# Patient Record
Sex: MALE | Race: WHITE | NOT HISPANIC OR LATINO | Employment: FULL TIME | URBAN - METROPOLITAN AREA
[De-identification: names, ages, dates, MRNs, and addresses within clinical notes are randomized per-mention and may not be internally consistent; named-entity substitution may affect disease eponyms.]

---

## 2024-09-05 ENCOUNTER — APPOINTMENT (OUTPATIENT)
Dept: RADIOLOGY | Facility: HOSPITAL | Age: 34
End: 2024-09-05
Payer: MEDICAID

## 2024-09-05 ENCOUNTER — HOSPITAL ENCOUNTER (EMERGENCY)
Facility: HOSPITAL | Age: 34
Discharge: HOME | End: 2024-09-05
Attending: STUDENT IN AN ORGANIZED HEALTH CARE EDUCATION/TRAINING PROGRAM
Payer: MEDICAID

## 2024-09-05 VITALS
DIASTOLIC BLOOD PRESSURE: 97 MMHG | HEART RATE: 96 BPM | OXYGEN SATURATION: 99 % | HEIGHT: 69 IN | BODY MASS INDEX: 23.7 KG/M2 | RESPIRATION RATE: 20 BRPM | WEIGHT: 160 LBS | TEMPERATURE: 98.1 F | SYSTOLIC BLOOD PRESSURE: 151 MMHG

## 2024-09-05 DIAGNOSIS — S52.531A COLLES' FRACTURE OF RIGHT RADIUS, INIT FOR CLOS FX: Primary | ICD-10-CM

## 2024-09-05 DIAGNOSIS — S52.614A CLOSED NONDISPLACED FRACTURE OF STYLOID PROCESS OF RIGHT ULNA, INITIAL ENCOUNTER: ICD-10-CM

## 2024-09-05 DIAGNOSIS — E87.6 HYPOKALEMIA: ICD-10-CM
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
## 2024-09-05 LAB
ABO GROUP (TYPE) IN BLOOD: NORMAL
ALBUMIN SERPL BCP-MCNC: 5.2 G/DL (ref 3.4–5)
ALP SERPL-CCNC: 62 U/L (ref 33–120)
ALT SERPL W P-5'-P-CCNC: 24 U/L (ref 10–52)
ANION GAP SERPL CALC-SCNC: 16 MMOL/L (ref 10–20)
ANTIBODY SCREEN: NORMAL
APTT PPP: 28 SECONDS (ref 27–38)
AST SERPL W P-5'-P-CCNC: 22 U/L (ref 9–39)
BASOPHILS # BLD AUTO: 0.05 X10*3/UL (ref 0–0.1)
BASOPHILS NFR BLD AUTO: 0.5 %
BILIRUB SERPL-MCNC: 0.4 MG/DL (ref 0–1.2)
BUN SERPL-MCNC: 17 MG/DL (ref 6–23)
CALCIUM SERPL-MCNC: 9.6 MG/DL (ref 8.6–10.3)
CHLORIDE SERPL-SCNC: 105 MMOL/L (ref 98–107)
CO2 SERPL-SCNC: 25 MMOL/L (ref 21–32)
CREAT SERPL-MCNC: 1.04 MG/DL (ref 0.5–1.3)
EGFRCR SERPLBLD CKD-EPI 2021: >90 ML/MIN/1.73M*2
EOSINOPHIL # BLD AUTO: 0.03 X10*3/UL (ref 0–0.7)
EOSINOPHIL NFR BLD AUTO: 0.3 %
ERYTHROCYTE [DISTWIDTH] IN BLOOD BY AUTOMATED COUNT: 12.8 % (ref 11.5–14.5)
GLUCOSE SERPL-MCNC: 112 MG/DL (ref 74–99)
HCT VFR BLD AUTO: 41.5 % (ref 41–52)
HGB BLD-MCNC: 14.5 G/DL (ref 13.5–17.5)
IMM GRANULOCYTES # BLD AUTO: 0.08 X10*3/UL (ref 0–0.7)
IMM GRANULOCYTES NFR BLD AUTO: 0.9 % (ref 0–0.9)
INR PPP: 1 (ref 0.9–1.1)
LYMPHOCYTES # BLD AUTO: 2.53 X10*3/UL (ref 1.2–4.8)
LYMPHOCYTES NFR BLD AUTO: 26.9 %
MAGNESIUM SERPL-MCNC: 1.73 MG/DL (ref 1.6–2.4)
MCH RBC QN AUTO: 32.1 PG (ref 26–34)
MCHC RBC AUTO-ENTMCNC: 34.9 G/DL (ref 32–36)
MCV RBC AUTO: 92 FL (ref 80–100)
MONOCYTES # BLD AUTO: 0.54 X10*3/UL (ref 0.1–1)
MONOCYTES NFR BLD AUTO: 5.8 %
NEUTROPHILS # BLD AUTO: 6.16 X10*3/UL (ref 1.2–7.7)
NEUTROPHILS NFR BLD AUTO: 65.6 %
NRBC BLD-RTO: 0 /100 WBCS (ref 0–0)
PLATELET # BLD AUTO: 392 X10*3/UL (ref 150–450)
POTASSIUM SERPL-SCNC: 3.1 MMOL/L (ref 3.5–5.3)
PROT SERPL-MCNC: 7.5 G/DL (ref 6.4–8.2)
PROTHROMBIN TIME: 11.6 SECONDS (ref 9.8–12.8)
RBC # BLD AUTO: 4.52 X10*6/UL (ref 4.5–5.9)
RH FACTOR (ANTIGEN D): NORMAL
SODIUM SERPL-SCNC: 143 MMOL/L (ref 136–145)
WBC # BLD AUTO: 9.4 X10*3/UL (ref 4.4–11.3)

## 2024-09-05 PROCEDURE — 73060 X-RAY EXAM OF HUMERUS: CPT | Mod: RT

## 2024-09-05 PROCEDURE — 2500000002 HC RX 250 W HCPCS SELF ADMINISTERED DRUGS (ALT 637 FOR MEDICARE OP, ALT 636 FOR OP/ED)

## 2024-09-05 PROCEDURE — 73090 X-RAY EXAM OF FOREARM: CPT | Mod: RIGHT SIDE | Performed by: RADIOLOGY

## 2024-09-05 PROCEDURE — 36415 COLL VENOUS BLD VENIPUNCTURE: CPT

## 2024-09-05 PROCEDURE — 85610 PROTHROMBIN TIME: CPT

## 2024-09-05 PROCEDURE — 80053 COMPREHEN METABOLIC PANEL: CPT

## 2024-09-05 PROCEDURE — 2500000001 HC RX 250 WO HCPCS SELF ADMINISTERED DRUGS (ALT 637 FOR MEDICARE OP)

## 2024-09-05 PROCEDURE — 96375 TX/PRO/DX INJ NEW DRUG ADDON: CPT | Performed by: EMERGENCY MEDICINE

## 2024-09-05 PROCEDURE — 29125 APPL SHORT ARM SPLINT STATIC: CPT

## 2024-09-05 PROCEDURE — 2500000005 HC RX 250 GENERAL PHARMACY W/O HCPCS

## 2024-09-05 PROCEDURE — 73080 X-RAY EXAM OF ELBOW: CPT | Mod: RT

## 2024-09-05 PROCEDURE — 73090 X-RAY EXAM OF FOREARM: CPT | Mod: RT

## 2024-09-05 PROCEDURE — 96376 TX/PRO/DX INJ SAME DRUG ADON: CPT | Performed by: EMERGENCY MEDICINE

## 2024-09-05 PROCEDURE — 2500000004 HC RX 250 GENERAL PHARMACY W/ HCPCS (ALT 636 FOR OP/ED)

## 2024-09-05 PROCEDURE — 85025 COMPLETE CBC W/AUTO DIFF WBC: CPT

## 2024-09-05 PROCEDURE — 25605 CLTX DST RDL FX/EPHYS SEP W/: CPT | Mod: RT | Performed by: STUDENT IN AN ORGANIZED HEALTH CARE EDUCATION/TRAINING PROGRAM

## 2024-09-05 PROCEDURE — 84075 ASSAY ALKALINE PHOSPHATASE: CPT

## 2024-09-05 PROCEDURE — 73110 X-RAY EXAM OF WRIST: CPT | Mod: RIGHT SIDE | Performed by: RADIOLOGY

## 2024-09-05 PROCEDURE — 86901 BLOOD TYPING SEROLOGIC RH(D): CPT

## 2024-09-05 PROCEDURE — 73110 X-RAY EXAM OF WRIST: CPT | Mod: RT

## 2024-09-05 PROCEDURE — 83735 ASSAY OF MAGNESIUM: CPT

## 2024-09-05 PROCEDURE — 99285 EMERGENCY DEPT VISIT HI MDM: CPT | Mod: 25 | Performed by: EMERGENCY MEDICINE

## 2024-09-05 PROCEDURE — 73080 X-RAY EXAM OF ELBOW: CPT | Mod: RIGHT SIDE | Performed by: RADIOLOGY

## 2024-09-05 PROCEDURE — 96374 THER/PROPH/DIAG INJ IV PUSH: CPT | Performed by: EMERGENCY MEDICINE

## 2024-09-05 PROCEDURE — 73060 X-RAY EXAM OF HUMERUS: CPT | Mod: RIGHT SIDE | Performed by: RADIOLOGY

## 2024-09-05 RX ORDER — OXYCODONE HYDROCHLORIDE 5 MG/1
5 TABLET ORAL ONCE
Status: COMPLETED | OUTPATIENT
Start: 2024-09-05 | End: 2024-09-05

## 2024-09-05 RX ORDER — ACETAMINOPHEN 325 MG/1
650 TABLET ORAL EVERY 6 HOURS PRN
Qty: 56 TABLET | Refills: 0 | Status: SHIPPED | OUTPATIENT
Start: 2024-09-05 | End: 2024-09-12

## 2024-09-05 RX ORDER — LIDOCAINE HYDROCHLORIDE AND EPINEPHRINE 10; 10 MG/ML; UG/ML
10 INJECTION, SOLUTION INFILTRATION; PERINEURAL ONCE
Status: COMPLETED | OUTPATIENT
Start: 2024-09-05 | End: 2024-09-05

## 2024-09-05 RX ORDER — IBUPROFEN 600 MG/1
600 TABLET ORAL EVERY 6 HOURS PRN
Qty: 28 TABLET | Refills: 0 | Status: SHIPPED | OUTPATIENT
Start: 2024-09-05 | End: 2024-09-12

## 2024-09-05 RX ORDER — HYDROMORPHONE HYDROCHLORIDE 1 MG/ML
1 INJECTION, SOLUTION INTRAMUSCULAR; INTRAVENOUS; SUBCUTANEOUS ONCE
Status: COMPLETED | OUTPATIENT
Start: 2024-09-05 | End: 2024-09-05

## 2024-09-05 RX ORDER — MIDAZOLAM HYDROCHLORIDE 1 MG/ML
2 INJECTION, SOLUTION INTRAMUSCULAR; INTRAVENOUS ONCE
Status: COMPLETED | OUTPATIENT
Start: 2024-09-05 | End: 2024-09-05

## 2024-09-05 RX ORDER — HYDROCODONE BITARTRATE AND ACETAMINOPHEN 5; 325 MG/1; MG/1
1 TABLET ORAL EVERY 4 HOURS PRN
Qty: 18 TABLET | Refills: 0 | Status: SHIPPED | OUTPATIENT
Start: 2024-09-05 | End: 2024-09-08

## 2024-09-05 RX ORDER — FENTANYL CITRATE 50 UG/ML
50 INJECTION, SOLUTION INTRAMUSCULAR; INTRAVENOUS ONCE
Status: COMPLETED | OUTPATIENT
Start: 2024-09-05 | End: 2024-09-05

## 2024-09-05 RX ORDER — POTASSIUM CHLORIDE 14.9 MG/ML
20 INJECTION INTRAVENOUS
Status: DISCONTINUED | OUTPATIENT
Start: 2024-09-05 | End: 2024-09-05

## 2024-09-05 RX ORDER — POTASSIUM CHLORIDE 20 MEQ/1
40 TABLET, EXTENDED RELEASE ORAL ONCE
Status: COMPLETED | OUTPATIENT
Start: 2024-09-05 | End: 2024-09-05

## 2024-09-05 RX ADMIN — HYDROMORPHONE HYDROCHLORIDE 1 MG: 1 INJECTION INTRAMUSCULAR; INTRAVENOUS; SUBCUTANEOUS at 16:45

## 2024-09-05 RX ADMIN — HYDROMORPHONE HYDROCHLORIDE 1 MG: 1 INJECTION INTRAMUSCULAR; INTRAVENOUS; SUBCUTANEOUS at 15:04

## 2024-09-05 RX ADMIN — LIDOCAINE HYDROCHLORIDE,EPINEPHRINE BITARTRATE 10 ML: 10; .01 INJECTION, SOLUTION INFILTRATION; PERINEURAL at 17:21

## 2024-09-05 RX ADMIN — POTASSIUM CHLORIDE 40 MEQ: 1500 TABLET, EXTENDED RELEASE ORAL at 16:26

## 2024-09-05 RX ADMIN — OXYCODONE HYDROCHLORIDE 5 MG: 5 TABLET ORAL at 18:28

## 2024-09-05 RX ADMIN — FENTANYL CITRATE 50 MCG: 50 INJECTION INTRAMUSCULAR; INTRAVENOUS at 18:28

## 2024-09-05 RX ADMIN — MIDAZOLAM 2 MG: 1 INJECTION INTRAMUSCULAR; INTRAVENOUS at 17:18

## 2024-09-05 ASSESSMENT — COLUMBIA-SUICIDE SEVERITY RATING SCALE - C-SSRS
2. HAVE YOU ACTUALLY HAD ANY THOUGHTS OF KILLING YOURSELF?: NO
6. HAVE YOU EVER DONE ANYTHING, STARTED TO DO ANYTHING, OR PREPARED TO DO ANYTHING TO END YOUR LIFE?: NO
1. IN THE PAST MONTH, HAVE YOU WISHED YOU WERE DEAD OR WISHED YOU COULD GO TO SLEEP AND NOT WAKE UP?: NO

## 2024-09-05 ASSESSMENT — PAIN SCALES - GENERAL
PAINLEVEL_OUTOF10: 10 - WORST POSSIBLE PAIN
PAINLEVEL_OUTOF10: 10 - WORST POSSIBLE PAIN
PAINLEVEL_OUTOF10: 8
PAINLEVEL_OUTOF10: 9

## 2024-09-05 ASSESSMENT — PAIN - FUNCTIONAL ASSESSMENT
PAIN_FUNCTIONAL_ASSESSMENT: 0-10
PAIN_FUNCTIONAL_ASSESSMENT: 0-10

## 2024-09-05 ASSESSMENT — PAIN DESCRIPTION - ORIENTATION: ORIENTATION: RIGHT

## 2024-09-05 ASSESSMENT — PAIN DESCRIPTION - LOCATION: LOCATION: ARM

## 2024-09-05 NOTE — ED PROVIDER NOTES
History of Present Illness     History provided by: Patient  Limitations to History: None  External Records Reviewed with Brief Summary: None    HPI:  Luigi Padilla is a 33 y.o. male with no past medical history presents for evaluation after a fall from 4 feet off of a ladder onto his right outstretched hand.  He has right upper extremity deformity.  He is not experiencing any headache back pain chest pain abdominal pain numbness tingling or weakness in his upper or lower extremities.  He did not injure anything else during his fall.  He takes no medications is not on blood thinners did not syncopized.  No breaks in the skin.    Physical Exam   Triage vitals:  T 36.7 °C (98.1 °F)  HR 81  BP (!) 154/107  RR 18  O2 97 % None (Room air)    General: Awake, alert, in no acute distress  Eyes: Gaze conjugate.  No scleral icterus or injection  HENT: Normo-cephalic, atraumatic. No stridor  CV: Regular rate, regular rhythm. Radial pulses 2+ bilaterally, 2+ DP and PT pulses bilaterally  Resp: Breathing non-labored, speaking in full sentences.  Clear to auscultation bilaterally  GI: Soft, non-distended, non-tender. No rebound or guarding.  : Deferred  MSK/Extremities: Gross deformity of the distal right forearm, no abrasion laceration or other deformities noted.  Limited range of motion at elbow and wrist due to pain, skin: Warm. Appropriate color no abrasion laceration  Neuro: Alert. Oriented. Face symmetric. Speech is fluent.  Gross strength and sensation intact in b/l UE and LEs, right upper extremity strength limited by deformity and pain  Psych: Appropriate mood and affect      Medical Decision Making & ED Course   Medical Decision Makin y.o. male who presents for evaluation after a fall off a ladder onto a right outstretched hand.  Patient is hemodynamically stable on head to toe assessment no other signs of obvious injury he is not on blood thinners did not pass out did not his head and did not at this  time have concern for blunt trauma to the head spine chest or abdomen concerning for internal injury or contusion requiring CT imaging.  Patient's blood work overall was obtained and is unremarkable with exception of mild hypokalemia which was repleted.  Patient was given IV analgesia for pain management in the setting of right upper extremity fracture.  Patient received a hematoma block and was placed in traction patient was given anxiolysis with Versed for fracture reduction and after improved alignment on repeat imaging patient was splinted in a sugar-tong.  Patient will follow-up with orthopedics on Monday regarding his acute orthopedic injury.  He is neurovascularly intact pre and post reduction.  Patient was given strict return precautions for worsening pain paresthesias or swelling, follow-up arranged.  All questions answered.  Will send patient with analgesia to preferred pharmacy.----    Differential diagnoses considered include but are not limited to: Traumatic injury to the right upper extrem ity with low suspicion for other underlying injury     Social Determinants of Health which Significantly Impact Care: Difficulty obtaining outpatient follow-up The following actions were taken to address these social determinants: Patient given referral to orthopedics and Patient given list of PCPs    Independent Result Review and Interpretation: Results were independently reviewed and interpreted by myself. Please see ED course and MDM for full interpretation.    Chronic conditions affecting the patient's care: As documented in the MDM    The patient was discussed with the following consultants/services:  On-call orthopedic surgeon who reviewed imaging and was agreeable with plan for sugar-tong splint and expedited outpatient follow-up.  Arrangements made for patient to follow-up with Dr. Ha on Monday for further orthopedic care.  Patient will need surgical fixation of the wrist on an outpatient basis.    Care  Considerations: As per [facility]    ED Course:  ED Course as of 09/05/24 2256   Thu Sep 05, 2024   1516 CBC and Auto Differential  No acute leukocytosis anemia or thrombocytopenia [SC]   1522 Coagulation Screen [SC]   1522 Coag within normal limits [SC]   1547 Comprehensive metabolic panel(!)  Patient has hypokalemia without acute renal or hepatic abnormality [SC]   1643 Emergency Medicine Attending Attestation:     My assessment reveals a 33-year-old male with right arm injury after falling 4 to 5 feet off a ladder.  Did not hit his head, did not lose consciousness.  Does not appear to have a distracting injury.  We are able to clear his C-spine clinically.  He is neurovascularly intact in the right upper extremity.  Will be given Dilaudid for pain control.  X-rays demonstrating distal radius fracture with dorsal angulation.  There is also intra-articular extension based on radiology interpretation.  Discussed with orthopedic surgery who recommended reduction in the ED, placement of sugar-tong splint, discharged with outpatient follow-up.  Patient without any other signs of trauma, no tenderness to the thoracoabdominal region.  Do not feel he requires CT head, C-spine, or CT chest abdomen pelvis.  Will plan to perform hematoma block and reduction with the ED resident and then discharge home. [SH]   2252 XR wrist right 3+ views  X-rays of the right upper extremity including elbow humerus wrist and forearm independently reviewed with acute displaced and comminuted fracture of the right radius with extension into the wrist.  Additionally patient has an ulnar styloid fracture. [SC]   2253 XR forearm right 2 views  And films reviewed with significant improvement in alignment of displaced radial fracture patient to be splinted in a sugar-tong [SC]      ED Course User Index  [SC] Silvana Aranda DO  [SH] Heber Condon MD         Diagnoses as of 09/05/24 2256   Fall from ladder, initial encounter   Hypokalemia   Closed  nondisplaced fracture of styloid process of right ulna, initial encounter   Colles' fracture of right radius, init for clos fx     Disposition   As a result of the work-up, the patient was discharged home.  he was informed of his diagnosis and instructed to come back with any concerns or worsening of condition.  he and was agreeable to the plan as discussed above.  he was given the opportunity to ask questions.  All of the patient's questions were answered.    Procedures   Procedures    Patient seen and discussed with ED attending physician.    Silvana Aranda DO  Emergency Medicine     Silvana Aranda DO  Resident  09/05/24 3105

## 2024-09-05 NOTE — DISCHARGE INSTRUCTIONS
You are seen for evaluation of a fall off a ladder and have a broken arm.  You were splinted here in the emergency department.  Do not get your splint wet.  Please look out for signs of numbness tingling or extreme pain in his arm, if you experience these please loosen your splint and come back for immediate valuation.  Make sure you follow-up with orthopedics on Monday.  I am sending oxycodone and Tylenol ibuprofen to your preferred pharmacy.  You additionally had low potassium which was given to you via a supplement pill.

## 2024-09-05 NOTE — ED PROCEDURE NOTE
Procedure  Orthopaedic Injury Treatment - Upper Extremity    Performed by: Silvana Aranda DO  Authorized by: Heber Condon MD    Consent:     Consent obtained:  Written    Risks discussed:  Fracture, nerve damage, vascular damage, irreducible dislocation and recurrent dislocation  Universal protocol:     Patient identity confirmed:  Verbally with patient  Location:     Location:  Wrist    Wrist location:  R wrist    Wrist dislocation type: distal radioulnar    Pre-procedure details:     Pre-procedure imaging:  X-ray    Imaging findings: fracture present      Distal perfusion: normal    Sedation:     Sedation type:  Anxiolysis (Versed)  Anesthesia:     Anesthesia method:  Local infiltration (Hematoma block)    Local anesthetic:  Lidocaine 1% WITH epi (12)  Procedure details:     Manipulation performed: yes      Wrist reduction method:  Direct traction    Reduction successful: yes      Reduction confirmed with imaging: yes      Immobilization:  Splint    Splint type:  Sugar tong    Supplies used:  Fiberglass, cotton padding and sling  Post-procedure details:     Neurological function: normal      Distal perfusion: normal      Procedure completion:  Tolerated well, no immediate complications  Comments:      I provided definitive fracture care.  Silvana Aranda DO  PGY-3 Emergency Medicine               Silvana Aranda DO  Resident  09/05/24 8852

## 2024-09-05 NOTE — Clinical Note
Luigi Padilla was seen and treated in our emergency department on 9/5/2024.  He may return to work on 09/10/2024.       If you have any questions or concerns, please don't hesitate to call.      Silvana Aranda, DO

## 2024-09-05 NOTE — ED TRIAGE NOTES
Pt brought to ED by [facility] s/o 4ft fall off a ladder onto concrete.  Right arm deformity.  Pt arrived with right arm immobilized.  Denies hitting head or any neck or back pain.

## 2024-09-06 ENCOUNTER — TELEPHONE (OUTPATIENT)
Dept: ORTHOPEDIC SURGERY | Facility: CLINIC | Age: 34
End: 2024-09-06
Payer: MEDICAID

## 2024-09-06 NOTE — TELEPHONE ENCOUNTER
PATIENT WAS TOLD TO COMING IN ON MONDAY, WE HAVE A OPENING ON THURSDAY CAN YOU LOOK AT HIS X RAY AND LET ME KNOW IF HE CAN WAIT TILL THURSDAY OR NEEDS TO BE SEEN MONDAY

## 2024-09-09 ENCOUNTER — OFFICE VISIT (OUTPATIENT)
Dept: ORTHOPEDIC SURGERY | Facility: CLINIC | Age: 34
End: 2024-09-09
Payer: MEDICAID

## 2024-09-09 DIAGNOSIS — S52.531A COLLES' FRACTURE OF RIGHT RADIUS, INIT FOR CLOS FX: Primary | ICD-10-CM

## 2024-09-09 DIAGNOSIS — G56.01 CARPAL TUNNEL SYNDROME ON RIGHT: ICD-10-CM

## 2024-09-09 PROCEDURE — L3908 WHO COCK-UP NONMOLDE PRE OTS: HCPCS | Performed by: ORTHOPAEDIC SURGERY

## 2024-09-09 PROCEDURE — 99203 OFFICE O/P NEW LOW 30 MIN: CPT | Performed by: ORTHOPAEDIC SURGERY

## 2024-09-09 RX ORDER — SODIUM CHLORIDE, SODIUM LACTATE, POTASSIUM CHLORIDE, CALCIUM CHLORIDE 600; 310; 30; 20 MG/100ML; MG/100ML; MG/100ML; MG/100ML
20 INJECTION, SOLUTION INTRAVENOUS CONTINUOUS
OUTPATIENT
Start: 2024-09-09

## 2024-09-09 RX ORDER — CEFAZOLIN SODIUM 2 G/100ML
2 INJECTION, SOLUTION INTRAVENOUS ONCE
OUTPATIENT
Start: 2024-09-09 | End: 2024-09-09

## 2024-09-09 ASSESSMENT — PAIN - FUNCTIONAL ASSESSMENT: PAIN_FUNCTIONAL_ASSESSMENT: 0-10

## 2024-09-09 ASSESSMENT — ENCOUNTER SYMPTOMS
SINUS PAIN: 0
FATIGUE: 0
WHEEZING: 0
FEVER: 0
CHILLS: 0
JOINT SWELLING: 1
ARTHRALGIAS: 1
SHORTNESS OF BREATH: 0
TROUBLE SWALLOWING: 0

## 2024-09-09 ASSESSMENT — PAIN SCALES - GENERAL: PAINLEVEL_OUTOF10: 8

## 2024-09-10 ENCOUNTER — PREP FOR PROCEDURE (OUTPATIENT)
Dept: ORTHOPEDIC SURGERY | Facility: CLINIC | Age: 34
End: 2024-09-10
Payer: MEDICAID

## 2024-09-10 DIAGNOSIS — S52.531A COLLES' FRACTURE OF RIGHT RADIUS, INIT FOR CLOS FX: Primary | ICD-10-CM

## 2024-09-12 ENCOUNTER — APPOINTMENT (OUTPATIENT)
Dept: ORTHOPEDIC SURGERY | Facility: HOSPITAL | Age: 34
End: 2024-09-12
Payer: MEDICAID

## 2024-09-12 ENCOUNTER — OFFICE VISIT (OUTPATIENT)
Dept: ORTHOPEDIC SURGERY | Facility: HOSPITAL | Age: 34
End: 2024-09-12
Payer: MEDICAID

## 2024-09-12 DIAGNOSIS — S52.531A COLLES' FRACTURE OF RIGHT RADIUS, INIT FOR CLOS FX: Primary | ICD-10-CM

## 2024-09-12 PROCEDURE — 99213 OFFICE O/P EST LOW 20 MIN: CPT | Performed by: ORTHOPAEDIC SURGERY

## 2024-09-12 ASSESSMENT — PAIN - FUNCTIONAL ASSESSMENT: PAIN_FUNCTIONAL_ASSESSMENT: 0-10

## 2024-09-12 ASSESSMENT — PAIN SCALES - GENERAL: PAINLEVEL_OUTOF10: 5 - MODERATE PAIN

## 2024-09-13 NOTE — PROGRESS NOTES
Reason for Appointment  Chief Complaint   Patient presents with    Right Wrist - Follow-up     History of Present Illness  Patient is a 33 y.o. male here today for follow-up evaluation of wrist checked in discussion again about surgery, he is doing much better he has done well elevation and swelling is down numbness is better but with some mild carpal tunnel findings and we discussed surgical intervention at length he is going to stay in his brace but come out for hygiene.  Continue to work on range of motion we discussed the surgery again at length and the recovery and the risk benefits alternatives and wished proceed..     History reviewed. No pertinent past medical history.    History reviewed. No pertinent surgical history.    Medication Documentation Review Audit       Reviewed by Casey Cotto MD (Physician) on 24 at 1608      Medication Order Taking? Sig Documenting Provider Last Dose Status   acetaminophen (TylenoL) 325 mg tablet [id number] No Take 2 tablets (650 mg) by mouth every 6 hours if needed for mild pain (1 - 3) or fever (temp greater than 38.0 C) for up to 7 days. Silvana Aranda, DO Taking  24 235   HYDROcodone-acetaminophen (Norco) 5-325 mg tablet [id number]  Take 1 tablet by mouth every 4 hours if needed for severe pain (7 - 10) for up to 3 days. Silvana Aranda, DO   24 235   ibuprofen 600 mg tablet [id number] No Take 1 tablet (600 mg) by mouth every 6 hours if needed for mild pain (1 - 3) or fever (temp greater than 38.0 C) for up to 7 days. Silvana Aranda, DO Taking  24                    No Known Allergies    Review of Systems  Unchanged  Exam   Exam reveals improved range of motion less numbness compartments are soft mild swelling to the wrist good capillary refill good pulses  Assessment   Displaced distal radius fracture with mild carpal tunnel findings    Plan   Plan will be for surgery on Wednesday of next week with carpal tunnel release  answered all the patient's preoperative questions and he understands preoperative n.p.o. status and medications, wishes to proceed informed consent obtained

## 2024-09-17 ENCOUNTER — PRE-ADMISSION TESTING (OUTPATIENT)
Dept: PREADMISSION TESTING | Facility: HOSPITAL | Age: 34
End: 2024-09-17
Payer: MEDICAID

## 2024-09-17 VITALS
HEIGHT: 70 IN | SYSTOLIC BLOOD PRESSURE: 134 MMHG | WEIGHT: 158.3 LBS | DIASTOLIC BLOOD PRESSURE: 104 MMHG | BODY MASS INDEX: 22.66 KG/M2 | TEMPERATURE: 98.6 F | OXYGEN SATURATION: 100 % | HEART RATE: 86 BPM

## 2024-09-17 DIAGNOSIS — S52.531A COLLES' FRACTURE OF RIGHT RADIUS, INIT FOR CLOS FX: ICD-10-CM

## 2024-09-17 DIAGNOSIS — Z01.818 PRE-OP EXAMINATION: Primary | ICD-10-CM

## 2024-09-17 PROCEDURE — 99203 OFFICE O/P NEW LOW 30 MIN: CPT

## 2024-09-17 RX ORDER — CHLORHEXIDINE GLUCONATE ORAL RINSE 1.2 MG/ML
SOLUTION DENTAL
Qty: 473 ML | Refills: 0 | Status: SHIPPED | OUTPATIENT
Start: 2024-09-17 | End: 2024-09-18

## 2024-09-17 ASSESSMENT — DUKE ACTIVITY SCORE INDEX (DASI)
CAN YOU DO HEAVY WORK AROUND THE HOUSE LIKE SCRUBBING FLOORS OR LIFTING AND MOVING HEAVY FURNITURE: YES
CAN YOU PARTICIPATE IN MODERATE RECREATIONAL ACTIVITIES LIKE GOLF, BOWLING, DANCING, DOUBLES TENNIS OR THROWING A BASEBALL OR FOOTBALL: YES
CAN YOU DO MODERATE WORK AROUND THE HOUSE LIKE VACUUMING, SWEEPING FLOORS OR CARRYING GROCERIES: YES
DASI METS SCORE: 9.9
CAN YOU WALK INDOORS, SUCH AS AROUND YOUR HOUSE: YES
CAN YOU DO LIGHT WORK AROUND THE HOUSE LIKE DUSTING OR WASHING DISHES: YES
CAN YOU DO YARD WORK LIKE RAKING LEAVES, WEEDING OR PUSHING A MOWER: YES
CAN YOU WALK A BLOCK OR TWO ON LEVEL GROUND: YES
CAN YOU RUN A SHORT DISTANCE: YES
CAN YOU CLIMB A FLIGHT OF STAIRS OR WALK UP A HILL: YES
CAN YOU TAKE CARE OF YOURSELF (EAT, DRESS, BATHE, OR USE TOILET): YES
CAN YOU HAVE SEXUAL RELATIONS: YES
CAN YOU PARTICIPATE IN STRENOUS SPORTS LIKE SWIMMING, SINGLES TENNIS, FOOTBALL, BASKETBALL, OR SKIING: YES
TOTAL_SCORE: 58.2

## 2024-09-17 ASSESSMENT — ENCOUNTER SYMPTOMS
ENDOCRINE NEGATIVE: 1
NEUROLOGICAL NEGATIVE: 1
CONSTITUTIONAL NEGATIVE: 1
GASTROINTESTINAL NEGATIVE: 1
RESPIRATORY NEGATIVE: 1
CARDIOVASCULAR NEGATIVE: 1
JOINT SWELLING: 1
HEMATOLOGIC/LYMPHATIC NEGATIVE: 1
PSYCHIATRIC NEGATIVE: 1
ALLERGIC/IMMUNOLOGIC NEGATIVE: 1
EYES NEGATIVE: 1

## 2024-09-17 NOTE — PREPROCEDURE INSTRUCTIONS
PAT DISCHARGE INSTRUCTIONS    Please call the Same Day Surgery (SDS) Department of the hospital where your procedure will be performed after 2:00 PM the day before your surgery. If you are scheduled on a Monday, or a Tuesday following a Monday holiday, you will need to call on the last business day prior to your surgery.    [facility]  [address] [address] [facility], [address]  [phone]    [facility]  [address] [place] [address] [phone]    [facility]  [address]  [place] [id number]  [phone]    Please let your surgeon know if:      You develop any open sores, shingles, burning or painful urination as these may increase your risk of an infection.   You no longer wish to have the surgery.   Any other personal circumstances change that may lead to the need to cancel or defer this surgery-such as being sick or getting admitted to any hospital within one week of your planned procedure.    Your contact details change, such as a change of address or phone number.    Starting now:     Please DO NOT drink alcohol or smoke for 24 hours before surgery. It is well known that quitting smoking can make a huge difference to your health and recovery from surgery. The longer you abstain from smoking, the better your chances of a healthy recovery. If you need help with quitting, call 1-800-QUIT-NOW to be connected to a trained counselor who will discuss the best methods to help you quit.     Before your surgery:    Please stop all supplements 7 days prior to surgery. Or as directed by your surgeon.   Please stop taking NSAID pain medicine such as Advil and Motrin 7 days before surgery.    If you develop any fever, cough, cold, rashes, cuts, scratches, scrapes, urinary symptoms or infection anywhere on your body (including teeth and gums) prior to surgery, please call your surgeon’s office as soon as  possible. This may require treatment to reduce the chance of cancellation on the day of surgery.    The day before your surgery:   Get a good night’s rest.  Use the special soap for bathing if you have been instructed to use one.    Scheduled surgery times may change and you will be notified if this occurs - please check your personal voicemail for any updates.     On the morning of surgery:   Wear comfortable, loose fitting clothes which open in the front. Please do not wear moisturizers, creams, lotions, makeup or perfume.    Please bring with you to surgery:   Photo ID and insurance card   Current list of medicines and allergies   Pacemaker/ Defibrillator/Heart stent cards   CPAP machine and mask    Slings/ splints/ crutches   A copy of your complete advanced directive/DHPOA.    Please do NOT bring with you to surgery:   All jewelry and valuables should be left at home.   Prosthetic devices such as contact lenses, hearing aids, dentures, eyelash extensions, hairpins and body piercings must be removed prior to going in to the surgical suite.    After outpatient surgery:   A responsible adult MUST accompany you at the time of discharge and stay with you for 24 hours after your surgery. You may NOT drive yourself home after surgery.    Do not drive, operate machinery, make critical decisions or do activities that require co-ordination or balance until after a night’s sleep.   Do not drink alcoholic beverages for 24 hours.   Instructions for resuming your medications will be provided by your surgeon.    CALL YOUR DOCTOR AFTER SURGERY IF YOU HAVE:     Chills and/or a fever of 101° F or higher.    Redness, swelling, pus or drainage from your surgical wound or a bad smell from the wound.    Lightheadedness, fainting or confusion.    Persistent vomiting (throwing up) and are not able to eat or drink for 12 hours.    Three or more loose, watery bowel movements in 24 hours (diarrhea).   Difficulty or pain while urinating(  after non-urological surgery)    Pain and swelling in your legs, especially if it is only on one side.    Difficulty breathing or are breathing faster than normal.    Any new concerning symptoms.        Preoperative Fasting Guidelines    Why must I stop eating and drinking near surgery time?  With sedation, food or liquid in your stomach can enter your lungs causing serious complications  Increases nausea and vomiting    When do I need to stop eating and drinking before my surgery?  Do not eat any food after midnight the night before your surgery/procedure.  You may have up to 13 ounces of clear liquid until TWO hours before your instructed arrival time to the hospital.  This includes water, black tea/coffee, (no milk or cream) apple juice, and electrolyte drinks (Gatorade)  You may chew gum until TWO hours before your surgery/procedure        Patient Information: Pre-Operative Infection Prevention Measures     Why did I have my nose swabbed?  The purpose of the swab is to identify Staphylococcus aureus inside your nose. The swab was sent to the laboratory for culture.  A positive swab/culture for Staphylococcus aureus is called colonization or carriage.      What is Staphylococcus aureus?  Staphylococcus aureus, also known as “staph”, is a germ found on the skin or in the nose of healthy people.  Sometimes Staphylococcus aureus can get into the body and cause an infection.  This can be minor (such as pimples, boils, or other skin problems).  It might also be serious (such as a blood infection, pneumonia, or a surgical site infection).    What is Staphylococcus aureus colonization or carriage?  Colonization or carriage means that a person has the germ but is not sick from it.  These bacteria can be spread on the hands or when breathing or sneezing.    How is Staphylococcus aureus spread?  It is most often spread by close contact with a person or item that carries it.    What happens if my culture is positive for  Staphylococcus aureus?  Your doctor/medical team will use this information to guide any antibiotic treatment which may be necessary.  Regardless of the culture results, we will clean the inside of your nose with a betadine swab just before you have your surgery.      Will I get an infection if I have Staphylococcus aureus in my nose or on my skin?  Anyone can get an infection with Staphylococcus aureus.  However, the best way to reduce your risk of infection is to follow the instructions provided to you for the use of your CHG soap and dental rinse.        Patient Information: Oral/Dental Rinse    What is oral/dental rinse?   It is a mouthwash. It is a way of cleaning the mouth with a germ-killing solution before your surgery.  The solution contains chlorhexidine, commonly known as CHG.   It is used inside the mouth to kill a bacteria known as Staphylococcus aureus.  Let your doctor know if you are allergic to Chlorhexidine.    Why do I need to use CHG oral/dental rinse?  The CHG oral/dental rinse helps to kill a bacteria in your mouth known as Staphylococcus aureus.     This reduces the risk of infection at the surgical site.      Using your CHG oral/dental rinse  STEPS:  Use your CHG oral/dental rinse after you brush your teeth the night before (at bedtime) and the morning of your surgery.  Follow all directions on your prescription label.    Use the cap on the container to measure 15ml   Swish (gargle if you can) the mouthwash in your mouth for at least 30 seconds, (do not swallow) and spit out  After you use your CHG rinse, do not rinse your mouth with water, drink or eat.  Please refer to the prescription label for the appropriate time to resume oral intake      What side effects might I have using the CHG oral/dental rinse?  CHG rinse will stick to plaque on the teeth.  Brush and floss just before use.  Teeth brushing will help avoid staining of plaque during use.      Patient Information: Home Preoperative  Antibacterial Shower      What is a home preoperative antibacterial shower?  This shower is a way of cleaning the skin with a germ-killing solution before surgery.  The solution contains chlorhexidine, commonly known as CHG.  CHG is a skin cleanser with germ-killing ability.  Let your doctor know if you are allergic to chlorhexidine.    Why do I need to take a preoperative antibacterial shower?  Skin is not sterile.  It is best to try to make your skin as free of germs as possible before surgery.  Proper cleansing with a germ-killing soap before surgery can lower the number of germs on your skin.  This helps to reduce the risk of infection at the surgical site.  Following the instructions listed below will help you prepare your skin for surgery.      How do I use the solution?  Steps:  Begin using your CHG soap 5 days before your scheduled surgery on ________________________.    First, wash and rinse your hair using the CHG soap. Keep CHG soap away from ear canals and eyes.  Rinse completely, do not condition.  Hair extensions should be removed.  Wash your face with your normal soap and rinse.    Apply the CHG solution to a clean wet washcloth.  Turn the water off or move away from the water spray to avoid premature rinsing of the CHG soap as you are applying.   Firmly lather your entire body from the neck down.  Do not use on your face.  Pay special attention to the area(s) where your incision(s) will be located unless they are on your face.  Avoid scrubbing your skin too hard.  The important point is to have the CHG soap sit on your skin for 3 minutes.    When the 3 minutes are up, turn on the water and rinse the CHG solution off your body completely.   DO NOT wash with regular soap after you have used the CHG soap solution  Pat yourself dry with a clean, freshly-laundered towel.  DO NOT apply powders, deodorants, or lotions.  Dress in clean, freshly laundered nightclothes.    Be sure to sleep with clean, freshly  laundered sheets.  Be aware that CHG will cause stains on fabrics; if you wash them with bleach after use.  Rinse your washcloth and other linens that have contact with CHG completely.  Use only non-chlorine detergents to launder the items used.   The morning of surgery is the fifth day.  Repeat the above steps and dress in clean comfortable clothing     Whom should I contact if I have any questions regarding the use of CHG soap?  Call the [facility] at [phone] if you have any questions.              Medication List            Accurate as of September 17, 2024  8:05 AM. Always use your most recent med list.                acetaminophen 325 mg tablet  Commonly known as: TylenoL  Take 2 tablets (650 mg) by mouth every 6 hours if needed for mild pain (1 - 3) or fever (temp greater than 38.0 C) for up to 7 days.  Medication Adjustments for Surgery: Take/Use as prescribed     ibuprofen 600 mg tablet  Take 1 tablet (600 mg) by mouth every 6 hours if needed for mild pain (1 - 3) or fever (temp greater than 38.0 C) for up to 7 days.  Additional Medication Adjustments for Surgery: Other (Comment)  Notes to patient: STOP NOW

## 2024-09-17 NOTE — CPM/PAT H&P
CPM/PAT Evaluation       Name: Luigi Padilla (Luigi Padilla)  /Age: 1990/34 y.o.     In-Person       Chief Complaint: Right arm pain    HPI: Luigi Padilla is a 34 year old male that was seen in the ER 24 for right arm pain after a fall off his work truck. He states he put his right arm out to brace his fall. Xray of the right wrist revealed:   IMPRESSION:  Comminuted angulated and displaced fracture through the distal radius  with intra-articular extension and dorsal and dilation of the distal  fracture fragment. Accompanying mildly displaced ulnar styloid  fracture.    He denies numbness and tingling in the right arm. He states he takes ibuprofen and tylenol for the pain. He denies fever, chills, nausea, vomiting, chest pain, sob, dizziness, and palpitations.      No past medical history on file.    No past surgical history on file.    Social History     Tobacco Use    Smoking status: Every Day     Current packs/day: 0.25     Average packs/day: 0.3 packs/day for 15.7 years (3.9 ttl pk-yrs)     Types: Cigarettes     Start date:     Smokeless tobacco: Never    Tobacco comments:     BLACK AND MILD   Substance Use Topics    Alcohol use: Yes     Alcohol/week: 14.0 standard drinks of alcohol     Types: 14 Cans of beer per week     Social History     Substance and Sexual Activity   Drug Use Yes    Frequency: 7.0 times per week    Types: Marijuana         No family history on file.    No Known Allergies    Current Outpatient Medications   Medication Sig Dispense Refill    acetaminophen (TylenoL) 325 mg tablet Take 2 tablets (650 mg) by mouth every 6 hours if needed for mild pain (1 - 3) or fever (temp greater than 38.0 C) for up to 7 days. 56 tablet 0    ibuprofen 600 mg tablet Take 1 tablet (600 mg) by mouth every 6 hours if needed for mild pain (1 - 3) or fever (temp greater than 38.0 C) for up to 7 days. 28 tablet 0    chlorhexidine (Peridex) 0.12 % solution Use as directed 473 mL 0     No  current facility-administered medications for this visit.       Review of Systems   Constitutional: Negative.    HENT: Negative.     Eyes: Negative.    Respiratory: Negative.     Cardiovascular: Negative.    Gastrointestinal: Negative.    Endocrine: Negative.    Genitourinary: Negative.    Musculoskeletal:  Positive for joint swelling.        Right arm pain     Skin: Negative.    Allergic/Immunologic: Negative.    Neurological: Negative.    Hematological: Negative.    Psychiatric/Behavioral: Negative.                Physical Exam  Vitals reviewed.   Constitutional:       Appearance: Normal appearance.   HENT:      Head: Normocephalic and atraumatic.      Nose: Nose normal.      Mouth/Throat:      Mouth: Mucous membranes are moist.      Pharynx: Oropharynx is clear.   Eyes:      Extraocular Movements: Extraocular movements intact.      Conjunctiva/sclera: Conjunctivae normal.   Cardiovascular:      Rate and Rhythm: Normal rate and regular rhythm.      Pulses: Normal pulses.      Heart sounds: Normal heart sounds.   Pulmonary:      Effort: Pulmonary effort is normal.      Breath sounds: Normal breath sounds.   Abdominal:      General: Bowel sounds are normal.      Palpations: Abdomen is soft.      Hernia: A hernia is present.   Genitourinary:     Comments: Assessment referred to surgeon    Musculoskeletal:         General: Swelling (right lower arm) present.      Cervical back: Normal range of motion and neck supple.      Comments: Right wrist splint/brace   Skin:     General: Skin is warm and dry.   Neurological:      General: No focal deficit present.      Mental Status: He is alert and oriented to person, place, and time.   Psychiatric:         Mood and Affect: Mood normal.         Behavior: Behavior normal.         Thought Content: Thought content normal.         Judgment: Judgment normal.          PAT AIRWAY:   Airway:     Mallampati::  II    TM distance::  >3 FB    Neck ROM::  Full  normal      BP (!) 134/104    "Pulse 86   Temp 37 °C (98.6 °F) (Temporal)   Ht 1.778 m (5' 10\")   Wt 71.8 kg (158 lb 4.8 oz)   SpO2 100%   BMI 22.71 kg/m²       ASA:1  AUBREE: 1.9%  RCRI: 0.4%      DASI Risk Score      Flowsheet Row Most Recent Value   DASI SCORE 58.2   METS Score (Will be calculated only when all the questions are answered) 9.9          Caprini DVT Assessment    No data to display       Modified Frailty Index    No data to display       CHADS2 Stroke Risk         N/A 3 to 100%: High Risk   2 to < 3%: Medium Risk   0 to < 2%: Low Risk     Last Change: N/A          This score determines the patient's risk of having a stroke if the patient has atrial fibrillation.        This score is not applicable to this patient. Components are not calculated.          Revised Cardiac Risk Index      Flowsheet Row Most Recent Value   Revised Cardiac Risk Calculator 0          Apfel Simplified Score    No data to display       Risk Analysis Index Results This Encounter    No data found in the last 1 encounters.       Stop Bang Score      Flowsheet Row Most Recent Value   Do you snore loudly? 0   Do you often feel tired or fatigued after your sleep? 0   Has anyone ever observed you stop breathing in your sleep? 0   Do you have or are you being treated for high blood pressure? 0   Recent BMI (Calculated) 23.6   Is BMI greater than 35 kg/m2? 0=No   Age older than 50 years old? 0=No   Is your neck circumference greater than 17 inches (Male) or 16 inches (Female)? 0   Gender - Male 1=Yes   STOP-BANG Total Score 1            Assessment and Plan:     Colles' fracture of right radius, init for clos fx : Open Reduction Internal Fixation Radius , Decompression Median Nerve   Cigarette smoker: 1/4 a pack a day  Marijuana: daily    LABS: 9/5/24    MATT London-CNP          "

## 2024-09-18 ENCOUNTER — APPOINTMENT (OUTPATIENT)
Dept: RADIOLOGY | Facility: HOSPITAL | Age: 34
End: 2024-09-18
Payer: MEDICAID

## 2024-09-18 ENCOUNTER — ANESTHESIA (OUTPATIENT)
Dept: OPERATING ROOM | Facility: HOSPITAL | Age: 34
End: 2024-09-18
Payer: MEDICAID

## 2024-09-18 ENCOUNTER — ANESTHESIA EVENT (OUTPATIENT)
Dept: OPERATING ROOM | Facility: HOSPITAL | Age: 34
End: 2024-09-18
Payer: MEDICAID

## 2024-09-18 ENCOUNTER — HOSPITAL ENCOUNTER (OUTPATIENT)
Facility: HOSPITAL | Age: 34
Setting detail: OUTPATIENT SURGERY
Discharge: HOME | End: 2024-09-18
Attending: ORTHOPAEDIC SURGERY | Admitting: ORTHOPAEDIC SURGERY
Payer: MEDICAID

## 2024-09-18 VITALS
SYSTOLIC BLOOD PRESSURE: 148 MMHG | HEIGHT: 70 IN | TEMPERATURE: 97.7 F | WEIGHT: 153.22 LBS | BODY MASS INDEX: 21.94 KG/M2 | HEART RATE: 78 BPM | OXYGEN SATURATION: 97 % | DIASTOLIC BLOOD PRESSURE: 108 MMHG | RESPIRATION RATE: 14 BRPM

## 2024-09-18 DIAGNOSIS — S52.531A COLLES' FRACTURE OF RIGHT RADIUS, INIT FOR CLOS FX: Primary | ICD-10-CM

## 2024-09-18 PROCEDURE — 2500000005 HC RX 250 GENERAL PHARMACY W/O HCPCS: Performed by: ORTHOPAEDIC SURGERY

## 2024-09-18 PROCEDURE — 2500000004 HC RX 250 GENERAL PHARMACY W/ HCPCS (ALT 636 FOR OP/ED): Performed by: NURSE ANESTHETIST, CERTIFIED REGISTERED

## 2024-09-18 PROCEDURE — C1713 ANCHOR/SCREW BN/BN,TIS/BN: HCPCS | Performed by: ORTHOPAEDIC SURGERY

## 2024-09-18 PROCEDURE — 3700000002 HC GENERAL ANESTHESIA TIME - EACH INCREMENTAL 1 MINUTE: Performed by: ORTHOPAEDIC SURGERY

## 2024-09-18 PROCEDURE — 2500000004 HC RX 250 GENERAL PHARMACY W/ HCPCS (ALT 636 FOR OP/ED): Mod: JZ | Performed by: PHYSICIAN ASSISTANT

## 2024-09-18 PROCEDURE — 2500000004 HC RX 250 GENERAL PHARMACY W/ HCPCS (ALT 636 FOR OP/ED): Performed by: STUDENT IN AN ORGANIZED HEALTH CARE EDUCATION/TRAINING PROGRAM

## 2024-09-18 PROCEDURE — 2780000003 HC OR 278 NO HCPCS: Performed by: ORTHOPAEDIC SURGERY

## 2024-09-18 PROCEDURE — 7100000009 HC PHASE TWO TIME - INITIAL BASE CHARGE: Performed by: ORTHOPAEDIC SURGERY

## 2024-09-18 PROCEDURE — 7100000010 HC PHASE TWO TIME - EACH INCREMENTAL 1 MINUTE: Performed by: ORTHOPAEDIC SURGERY

## 2024-09-18 PROCEDURE — 7100000001 HC RECOVERY ROOM TIME - INITIAL BASE CHARGE: Performed by: ORTHOPAEDIC SURGERY

## 2024-09-18 PROCEDURE — 7100000002 HC RECOVERY ROOM TIME - EACH INCREMENTAL 1 MINUTE: Performed by: ORTHOPAEDIC SURGERY

## 2024-09-18 PROCEDURE — 2720000007 HC OR 272 NO HCPCS: Performed by: ORTHOPAEDIC SURGERY

## 2024-09-18 PROCEDURE — 2500000004 HC RX 250 GENERAL PHARMACY W/ HCPCS (ALT 636 FOR OP/ED): Performed by: ORTHOPAEDIC SURGERY

## 2024-09-18 PROCEDURE — 25609 OPTX DST RD XART FX/EP SEP3+: CPT | Performed by: PHYSICIAN ASSISTANT

## 2024-09-18 PROCEDURE — 64721 CARPAL TUNNEL SURGERY: CPT | Performed by: ORTHOPAEDIC SURGERY

## 2024-09-18 PROCEDURE — 3600000009 HC OR TIME - EACH INCREMENTAL 1 MINUTE - PROCEDURE LEVEL FOUR: Performed by: ORTHOPAEDIC SURGERY

## 2024-09-18 PROCEDURE — 3600000004 HC OR TIME - INITIAL BASE CHARGE - PROCEDURE LEVEL FOUR: Performed by: ORTHOPAEDIC SURGERY

## 2024-09-18 PROCEDURE — 2500000004 HC RX 250 GENERAL PHARMACY W/ HCPCS (ALT 636 FOR OP/ED): Performed by: PHYSICIAN ASSISTANT

## 2024-09-18 PROCEDURE — 25609 OPTX DST RD XART FX/EP SEP3+: CPT | Performed by: ORTHOPAEDIC SURGERY

## 2024-09-18 PROCEDURE — 3700000001 HC GENERAL ANESTHESIA TIME - INITIAL BASE CHARGE: Performed by: ORTHOPAEDIC SURGERY

## 2024-09-18 DEVICE — IMPLANTABLE DEVICE: Type: IMPLANTABLE DEVICE | Site: WRIST | Status: FUNCTIONAL

## 2024-09-18 DEVICE — IMPLANTABLE DEVICE: Type: IMPLANTABLE DEVICE | Site: WRIST | Status: NON-FUNCTIONAL

## 2024-09-18 RX ORDER — BUPIVACAINE HYDROCHLORIDE 5 MG/ML
INJECTION, SOLUTION PERINEURAL AS NEEDED
Status: DISCONTINUED | OUTPATIENT
Start: 2024-09-18 | End: 2024-09-18 | Stop reason: HOSPADM

## 2024-09-18 RX ORDER — ONDANSETRON HYDROCHLORIDE 2 MG/ML
INJECTION, SOLUTION INTRAVENOUS AS NEEDED
Status: DISCONTINUED | OUTPATIENT
Start: 2024-09-18 | End: 2024-09-18

## 2024-09-18 RX ORDER — LIDOCAINE HYDROCHLORIDE 10 MG/ML
INJECTION, SOLUTION INFILTRATION; PERINEURAL AS NEEDED
Status: DISCONTINUED | OUTPATIENT
Start: 2024-09-18 | End: 2024-09-18 | Stop reason: HOSPADM

## 2024-09-18 RX ORDER — BUPIVACAINE HYDROCHLORIDE 5 MG/ML
INJECTION, SOLUTION PERINEURAL AS NEEDED
Status: DISCONTINUED | OUTPATIENT
Start: 2024-09-18 | End: 2024-09-18

## 2024-09-18 RX ORDER — ALBUTEROL SULFATE 0.83 MG/ML
2.5 SOLUTION RESPIRATORY (INHALATION) ONCE AS NEEDED
Status: DISCONTINUED | OUTPATIENT
Start: 2024-09-18 | End: 2024-09-18 | Stop reason: HOSPADM

## 2024-09-18 RX ORDER — ROPIVACAINE HYDROCHLORIDE 5 MG/ML
INJECTION, SOLUTION EPIDURAL; INFILTRATION; PERINEURAL AS NEEDED
Status: DISCONTINUED | OUTPATIENT
Start: 2024-09-18 | End: 2024-09-18

## 2024-09-18 RX ORDER — KETOROLAC TROMETHAMINE 30 MG/ML
INJECTION, SOLUTION INTRAMUSCULAR; INTRAVENOUS AS NEEDED
Status: DISCONTINUED | OUTPATIENT
Start: 2024-09-18 | End: 2024-09-18

## 2024-09-18 RX ORDER — ONDANSETRON HYDROCHLORIDE 2 MG/ML
4 INJECTION, SOLUTION INTRAVENOUS ONCE AS NEEDED
Status: DISCONTINUED | OUTPATIENT
Start: 2024-09-18 | End: 2024-09-18 | Stop reason: HOSPADM

## 2024-09-18 RX ORDER — ACETAMINOPHEN 325 MG/1
650 TABLET ORAL EVERY 4 HOURS PRN
Status: DISCONTINUED | OUTPATIENT
Start: 2024-09-18 | End: 2024-09-18 | Stop reason: HOSPADM

## 2024-09-18 RX ORDER — OXYCODONE HYDROCHLORIDE 5 MG/1
5 TABLET ORAL EVERY 4 HOURS PRN
Status: DISCONTINUED | OUTPATIENT
Start: 2024-09-18 | End: 2024-09-18 | Stop reason: HOSPADM

## 2024-09-18 RX ORDER — OXYCODONE HYDROCHLORIDE 5 MG/1
10 TABLET ORAL EVERY 4 HOURS PRN
Status: DISCONTINUED | OUTPATIENT
Start: 2024-09-18 | End: 2024-09-18 | Stop reason: HOSPADM

## 2024-09-18 RX ORDER — SODIUM CHLORIDE, SODIUM LACTATE, POTASSIUM CHLORIDE, CALCIUM CHLORIDE 600; 310; 30; 20 MG/100ML; MG/100ML; MG/100ML; MG/100ML
20 INJECTION, SOLUTION INTRAVENOUS CONTINUOUS
Status: DISCONTINUED | OUTPATIENT
Start: 2024-09-18 | End: 2024-09-18 | Stop reason: HOSPADM

## 2024-09-18 RX ORDER — CEFAZOLIN SODIUM 2 G/100ML
2 INJECTION, SOLUTION INTRAVENOUS ONCE
Status: COMPLETED | OUTPATIENT
Start: 2024-09-18 | End: 2024-09-18

## 2024-09-18 RX ORDER — SODIUM CHLORIDE, SODIUM LACTATE, POTASSIUM CHLORIDE, CALCIUM CHLORIDE 600; 310; 30; 20 MG/100ML; MG/100ML; MG/100ML; MG/100ML
100 INJECTION, SOLUTION INTRAVENOUS CONTINUOUS
Status: DISCONTINUED | OUTPATIENT
Start: 2024-09-18 | End: 2024-09-18 | Stop reason: HOSPADM

## 2024-09-18 RX ORDER — PROPOFOL 10 MG/ML
INJECTION, EMULSION INTRAVENOUS AS NEEDED
Status: DISCONTINUED | OUTPATIENT
Start: 2024-09-18 | End: 2024-09-18

## 2024-09-18 RX ORDER — TRANEXAMIC ACID 100 MG/ML
INJECTION, SOLUTION INTRAVENOUS AS NEEDED
Status: DISCONTINUED | OUTPATIENT
Start: 2024-09-18 | End: 2024-09-18 | Stop reason: HOSPADM

## 2024-09-18 RX ORDER — DOXYCYCLINE 100 MG/1
100 CAPSULE ORAL 2 TIMES DAILY
Qty: 14 CAPSULE | Refills: 0 | Status: SHIPPED | OUTPATIENT
Start: 2024-09-18 | End: 2024-09-25

## 2024-09-18 RX ORDER — MIDAZOLAM HYDROCHLORIDE 1 MG/ML
2 INJECTION, SOLUTION INTRAMUSCULAR; INTRAVENOUS ONCE
Status: COMPLETED | OUTPATIENT
Start: 2024-09-18 | End: 2024-09-18

## 2024-09-18 RX ORDER — HYDROMORPHONE HYDROCHLORIDE 0.2 MG/ML
0.2 INJECTION INTRAMUSCULAR; INTRAVENOUS; SUBCUTANEOUS EVERY 5 MIN PRN
Status: DISCONTINUED | OUTPATIENT
Start: 2024-09-18 | End: 2024-09-18 | Stop reason: HOSPADM

## 2024-09-18 RX ORDER — VANCOMYCIN HYDROCHLORIDE 1 G/20ML
INJECTION, POWDER, LYOPHILIZED, FOR SOLUTION INTRAVENOUS AS NEEDED
Status: DISCONTINUED | OUTPATIENT
Start: 2024-09-18 | End: 2024-09-18 | Stop reason: HOSPADM

## 2024-09-18 RX ORDER — HYDROMORPHONE HYDROCHLORIDE 0.2 MG/ML
INJECTION INTRAMUSCULAR; INTRAVENOUS; SUBCUTANEOUS AS NEEDED
Status: DISCONTINUED | OUTPATIENT
Start: 2024-09-18 | End: 2024-09-18

## 2024-09-18 RX ORDER — FENTANYL CITRATE 50 UG/ML
50 INJECTION, SOLUTION INTRAMUSCULAR; INTRAVENOUS ONCE
Status: COMPLETED | OUTPATIENT
Start: 2024-09-18 | End: 2024-09-18

## 2024-09-18 RX ORDER — HYDROCODONE BITARTRATE AND ACETAMINOPHEN 5; 325 MG/1; MG/1
1 TABLET ORAL EVERY 6 HOURS PRN
Qty: 20 TABLET | Refills: 0 | Status: SHIPPED | OUTPATIENT
Start: 2024-09-18 | End: 2024-09-23

## 2024-09-18 ASSESSMENT — COLUMBIA-SUICIDE SEVERITY RATING SCALE - C-SSRS
6. HAVE YOU EVER DONE ANYTHING, STARTED TO DO ANYTHING, OR PREPARED TO DO ANYTHING TO END YOUR LIFE?: NO
2. HAVE YOU ACTUALLY HAD ANY THOUGHTS OF KILLING YOURSELF?: NO
1. IN THE PAST MONTH, HAVE YOU WISHED YOU WERE DEAD OR WISHED YOU COULD GO TO SLEEP AND NOT WAKE UP?: NO

## 2024-09-18 ASSESSMENT — PAIN SCALES - GENERAL
PAINLEVEL_OUTOF10: 5 - MODERATE PAIN
PAINLEVEL_OUTOF10: 8
PAINLEVEL_OUTOF10: 6
PAINLEVEL_OUTOF10: 0 - NO PAIN
PAINLEVEL_OUTOF10: 4

## 2024-09-18 ASSESSMENT — PAIN - FUNCTIONAL ASSESSMENT
PAIN_FUNCTIONAL_ASSESSMENT: 0-10

## 2024-09-18 NOTE — ADDENDUM NOTE
Addendum  created 09/18/24 1356 by ALEJO Maurer    Intraprocedure Event deleted, Intraprocedure Event edited, Intraprocedure Meds edited

## 2024-09-18 NOTE — ANESTHESIA PROCEDURE NOTES
Airway  Date/Time: 9/18/2024 11:52 AM  Urgency: elective    Airway not difficult    Staffing  Performed: CRNA   Authorized by: Alondra Vaughan MD    Performed by: MATT Maurer-CECE  Patient location during procedure: OR    Indications and Patient Condition  Indications for airway management: anesthesia and airway protection  Spontaneous ventilation: present  Sedation level: deep  Preoxygenated: yes  Patient position: sniffing  MILS maintained throughout  Mask difficulty assessment: 1 - vent by mask    Final Airway Details  Final airway type: supraglottic airway      Successful airway: classic  Size 4     Number of attempts at approach: 1  Number of other approaches attempted: 0    Additional Comments  LMA lubricated. Atraumatic insertion.

## 2024-09-18 NOTE — ADDENDUM NOTE
Addendum  created 09/18/24 1424 by ALEJO Maurer    Child order released for a procedure order, Clinical Note Signed, Intraprocedure Blocks edited, Intraprocedure Meds edited, Orders acknowledged in Narrator, SmartForm saved

## 2024-09-18 NOTE — ANESTHESIA POSTPROCEDURE EVALUATION
Patient: Luigi Padilla    Procedure Summary       Date: 09/18/24 Room / Location: [facility] / [facility]    Anesthesia Start: 1148 Anesthesia Stop:     Procedures:       Open Reduction Internal Fixation Radius (Right: Hand)      Decompression Median Nerve (hand innovations distal radius plate) (Right: Hand) Diagnosis:       Colles' fracture of right radius, init for clos fx      (Colles' fracture of right radius, init for clos fx [S52.531A])    Surgeons: Casey Cotto MD Responsible Provider: Alondra Vaughan MD    Anesthesia Type: general, regional ASA Status: 3            Anesthesia Type: general, regional    Vitals Value Taken Time   /99 09/18/24 1346   Temp 36 09/18/24 1346   Pulse 86 09/18/24 1346   Resp 16 09/18/24 1346   SpO2 100 09/18/24 1346       Anesthesia Post Evaluation    Patient location during evaluation: bedside  Patient participation: complete - patient participated  Level of consciousness: awake and alert  Pain management: adequate  Multimodal analgesia pain management approach  Airway patency: patent  Cardiovascular status: acceptable  Respiratory status: acceptable  Hydration status: acceptable  Postoperative Nausea and Vomiting: none        No notable events documented.

## 2024-09-18 NOTE — ANESTHESIA PREPROCEDURE EVALUATION
Patient: Luigi Padilla    Procedure Information       Date/Time: 09/18/24 1130    Procedures:       Open Reduction Internal Fixation Radius (Right: Hand)      Decompression Median Nerve (hand innovations distal radius plate) (Right: Hand)    Location: [facility] / [facility]    Surgeons: Casey Cotto MD            Relevant Problems   Anesthesia (within normal limits)      Cardiac (within normal limits)      Pulmonary (within normal limits)      Neuro (within normal limits)      GI (within normal limits)      /Renal (within normal limits)      Liver (within normal limits)      Endocrine (within normal limits)      Hematology (within normal limits)      Musculoskeletal (within normal limits)      HEENT (within normal limits)      ID (within normal limits)      Skin (within normal limits)      GYN (within normal limits)     History reviewed. No pertinent past medical history.    History reviewed. No pertinent surgical history.    No Known Allergies    Clinical information reviewed:   Tobacco  Allergies  Meds   Med Hx  Surg Hx   Fam Hx  Soc Hx        NPO Detail:  NPO/Void Status  Date of Last Liquid: 09/17/24  Time of Last Liquid: 2100  Date of Last Solid: 09/17/24  Time of Last Solid: 2100  Time of Last Void: 0945         Physical Exam    Airway  Mallampati: II  TM distance: >3 FB  Neck ROM: full     Cardiovascular   Rhythm: regular  Rate: normal     Dental    Pulmonary   Breath sounds clear to auscultation     Abdominal            Anesthesia Plan    History of general anesthesia?: yes  History of complications of general anesthesia?: no    ASA 3     general and regional     intravenous induction   Postoperative administration of opioids is intended.  Anesthetic plan and risks discussed with patient.  Use of blood products discussed with patient who.

## 2024-09-18 NOTE — DISCHARGE INSTRUCTIONS
You had wrist surgery today.  Local anesthesia was used during the procedure and you may have some numbness in the region for a few hours postoperatively.    Please leave the splint intact after surgery, if it gets too tight you may loosen the Ace wrap and cotton underneath but do not remove fully.    It is important to elevate the hand for the next 3 to 5 days, and move your fingers fully.  It is very normal to have some bruising in the region and it may travel down the forearm.    If able, you can take 800 mg of ibuprofen along with the prescription pain medication, you can alternate these every 4 hours and slowly wean off of the prescription pain medication.    Please call the office for a postop appointment in 10 to 14 days after surgery.

## 2024-09-18 NOTE — NURSING NOTE
Patient in Phase 2; dressed and up to chair with RN assist. Tolerating po fluids, moderate pain which has improved since receiving a nerve block and no complaint of nausea.     Family at bedside; discussed discharge instructions with patient and Family. All questions at this time answered.     Patient clinically appropriate for discharge. IV removed and patient transported to discharge area via wheelchair.     Patient is aware the his BP has been running high throughout today's encounter.  Patient is aware for the need to follow-up with PCP and have this rechecked. Patient denies CP, HA and/or any other symptoms.

## 2024-09-18 NOTE — OP NOTE
Open Reduction Internal Fixation Radius (R), Decompression Median Nerve (hand innovations distal radius plate) (R) Operative Note     Date: 2024  OR Location: [facility]    Name: Luigi Padilla, : 1990, Age: 34 y.o., MRN: [id number], Sex: male    Diagnosis  Pre-op Diagnosis      * Colles' fracture of right radius, init for clos fx [S52.531A] Post-op Diagnosis     * Colles' fracture of right radius, init for clos fx [S52.531A]     Procedures  Open Reduction Internal Fixation Radius  28472 - [place] OPTX DSTL RADL I-ARTIC FX/EPIPHYSL SEP 2 FRAG    Decompression Median Nerve (hand innovations distal radius plate)  16719 - [place] NEUROPLASTY &/TRANSPOS MEDIAN NRV CARPAL TUNNE  #1 open reduction internal fixation right distal radius fracture with 3 part fragment fixation for severe comminuted intra-articular distal radius fracture  #2 right open carpal tunnel release through a separate incision    Surgeons      * Casey Cotto - Primary    Resident/Fellow/Other Assistant:  Surgeons and Role:  * No surgeons found with a matching role *  Jeanie Hinds PA-C  Procedure Summary  Anesthesia: Regional, General  ASA: III  Anesthesia Staff: Anesthesiologist: Alondra Vaughan MD  CRNA: MATT Maurer-CRNA  Estimated Blood Loss: 10mL  Intra-op Medications:   Administrations occurring from 1130 to 1310 on 24:   Medication Name Total Dose   tranexamic acid (Cyklokapron) injection 1 g   lactated Ringer's infusion 1,025 mL   ceFAZolin (Ancef) 2 g in dextrose (iso)  mL 2 g   fentaNYL PF (Sublimaze) injection 50 mcg 200 mcg   midazolam (Versed) injection 2 mg 2 mg              Anesthesia Record               Intraprocedure I/O Totals          Intake    lactated Ringer's infusion 1522.50 mL    ceFAZolin (Ancef) 2 g in dextrose (iso)  mL 100.00 mL    Total Intake 1622.5 mL          Specimen: No specimens collected     Staff:   Circulator: Amelia Altamirano Person: Chet Altamirano Person: Leeanna Altamirano  Person: Federica Yeager Circulator: Christi         Drains and/or Catheters: * None in log *    Tourniquet Times:     Total Tourniquet Time Documented:  Arm - Upper (Right) - 55 minutes  Total: Arm - Upper (Right) - 55 minutes      Implants:  Implants       Type Name Action Serial No.      Screw PLATE, DVR, CROSS LOCK, STANDARD, RIGHT - [id number] Implanted      Screw K-WIRE, 1.6MM X 127MM, SS - [id number] Used, Not Implanted      Screw SCREW, LP NON-LOCKING, 2.7MM X 15MM - [id number] Implanted      Screw SCREW, MULTI-DIRECTIONAL, 2.7MM X 16MM - [id number] Implanted      Screw SCREW, MULTI-DIRECTIONAL, 2.7MM X 18MM - [id number] Implanted      Screw SCREW, MULTI-DIRECTIONAL, 2.7MM X 20MM - [id number] Implanted      Screw SCREW, MULTI-DIRECTIONAL, 2.7MM X 22MM - [id number] Implanted               Findings: Severely comminuted fracture with severe shortening    Indications: Luigi Padilla is an 34 y.o. male who is having surgery for Colles' fracture of right radius, init for clos fx [S52.531A].  Pleasant gentleman understands with the severe fracture there are severe risk of nerve artery tendon damage infection continued pain need for hardware removal especially in his case with the close nature of the fracture to the joint line wished proceed understands postoperative course explained at length again today wished to proceed still having some mild numbness symptoms so we will release his carpal tunnel given the best chance of no future symptoms    The patient was seen in the preoperative area. The risks, benefits, complications, treatment options, non-operative alternatives, expected recovery and outcomes were discussed with the patient. The possibilities of reaction to medication, pulmonary aspiration, injury to surrounding structures, bleeding, recurrent infection, the need for additional procedures, failure to diagnose a condition, and creating a complication requiring transfusion or operation were discussed with the  patient. The patient concurred with the proposed plan, giving informed consent.  The site of surgery was properly noted/marked if necessary per policy. The patient has been actively warmed in preoperative area. Preoperative antibiotics have been ordered and given within 1 hours of incision. Venous thrombosis prophylaxis have been ordered including bilateral sequential compression devices    Procedure Details: Pleasant patient brought the operating room after sterilely prepping draping performing a timeout we first made a 3 cm incision after placing abundant local over the carpal tunnel and distal radius we made a 3 cm carpal tunnel incision over the transverse carpal ankle and down through skin bluntly spreading on the transverse carpal ligament and released in the distal aspects to the superficial fat and proximally to greater than 4 cm forearm fascia the nerve was swollen but no other adhesions or other abnormalities we copious irrigated closed wound    Made a standard volar approach to the distal radius and we had to do more styloid and first dorsal compartment release to get a good reduction because this was very distal and impacted.  We placed our plate in the subchondral position and had to be more distal than usual to get good fixation on the piece we did do a small dorsal stab incision for temporary pinning to aid in reduction with good plate fixation with distal and proximal screws and we did 3 fragment styloid volar and dorsal fragments and we used mini C arm and these were right subchondral in the good position DRUJ was stable after fixation we let down the tourniquet and coagulated all bleeders we did copious chlorhexidine irrigation layered closure small amount of vancomycin powder in the subcutaneous pocket.  All compartments were soft dorsal volar splint was placed there were no complications Jeanie Hinds PA-C acted as surgical assistant during this case and her assistance greatly reduced  operative time and aided in performance of the case  Complications:  None; patient tolerated the procedure well.    Disposition: PACU - hemodynamically stable.  Condition: stable         Additional Details: 0    Attending Attestation: I performed the procedure.    Casey Cotto  Phone Number: [phone]

## 2024-09-18 NOTE — ANESTHESIA PROCEDURE NOTES
Peripheral Block    Patient location during procedure: pre-op  Start time: 9/18/2024 2:09 PM  End time: 9/18/2024 2:11 PM  Reason for block: at surgeon's request and post-op pain management  Staffing  Performed: CRNA   Authorized by: Alondra Vaughan MD    Performed by: LAEJO Maurer  Preanesthetic Checklist  Completed: patient identified, IV checked, site marked, risks and benefits discussed, surgical consent, monitors and equipment checked, pre-op evaluation and timeout performed   Timeout performed at: 9/18/2024 2:08 PM  Peripheral Block  Patient position: sitting  Prep: ChloraPrep  Patient monitoring: heart rate, cardiac monitor and continuous pulse ox  Block type: supraclavicular and brachial plexus  Laterality: right  Injection technique: single-shot  Needle  Needle type: short-bevel   Needle gauge: 22 G  Needle length: 5 cm  Needle localization: anatomical landmarks and ultrasound guidance  Assessment  Injection assessment: negative aspiration for heme, no paresthesia on injection, incremental injection and local visualized surrounding nerve on ultrasound  Paresthesia pain: none  Heart rate change: no  Additional Notes  Administration of post-op block decided upon by patient, CRNA, Dr. Dumas and Kirti   Pt. Tolerated well    Block meds:  Bupivacaine 0.5% 12.5 ml  Ropivacaine 0.5% 12.5 ml  Decadron PF 5mg/0.5ml

## 2024-09-30 ENCOUNTER — APPOINTMENT (OUTPATIENT)
Dept: ORTHOPEDIC SURGERY | Facility: CLINIC | Age: 34
End: 2024-09-30
Payer: MEDICAID

## 2024-09-30 ENCOUNTER — EVALUATION (OUTPATIENT)
Dept: OCCUPATIONAL THERAPY | Facility: CLINIC | Age: 34
End: 2024-09-30
Payer: MEDICAID

## 2024-09-30 ENCOUNTER — HOSPITAL ENCOUNTER (OUTPATIENT)
Dept: RADIOLOGY | Facility: CLINIC | Age: 34
Discharge: HOME | End: 2024-09-30
Payer: MEDICAID

## 2024-09-30 DIAGNOSIS — S52.531A COLLES' FRACTURE OF RIGHT RADIUS, INIT FOR CLOS FX: Primary | ICD-10-CM

## 2024-09-30 DIAGNOSIS — S52.531A COLLES' FRACTURE OF RIGHT RADIUS, INIT FOR CLOS FX: ICD-10-CM

## 2024-09-30 PROCEDURE — 73110 X-RAY EXAM OF WRIST: CPT | Mod: 50

## 2024-09-30 PROCEDURE — L3906 WHO W/O JOINTS CF: HCPCS | Performed by: OCCUPATIONAL THERAPIST

## 2024-09-30 PROCEDURE — 99024 POSTOP FOLLOW-UP VISIT: CPT | Performed by: ORTHOPAEDIC SURGERY

## 2024-09-30 NOTE — PROGRESS NOTES
Rehab Walk-in Note    Patient Name: Luigi Padilla  MRN: [id number]  Today's Date: 9/30/2024    Time In: 15:15  Time Out: 15:35  Splinting Time Entry: 20  Splint Code:       Referring Physician: Dr. Cotto    Subjective   Current Problem: Colles' Fracture R distal radius  History of Current Problem: Patient reports he fell off his work truck and broke his wrist on 9/58/24; underwent ORIF on 9/18/24.      Objective   General Visit Information: Patient to clinic directly from MD's office for splint fabrication      Clinical Presentation: Surgical incision ~6 cm in length, sutures removed this date at MD; good signs of healing; +moderate edema in distal FA/hand; +numbness/tingling in R thumb/IF  Splint Type: Wrist Immobilization Splint  Treatment order: Splint fabrication; regain immediate AROM R hand  Precautions: Splint AAT; NWB RUE       Pain: 2/10 R wrist     Home Living: Home with assistance     Prior Level of Function: Fully Independent; working as concrete truck mixer       Assessment/Plan   Therapist fabricated/fitted a wrist immobilization splint for patient's RUE.  Patient instructed patient in proper don/doff tech, wear recommendations, importance of skin monitoring, and splint care; written instruction provided. Provided patient with Stock[place]ette supplies to be worn with splint.   Patient to wear splint at all times as instructed patient physician/therapist.  Therapist instructed patient in scapular ther ex, AROM elbow flexion/extension as patient lacks ~30 degrees of extension at this time due to sling wear; instructed patient in composite flexion/extension of R hand within restraints of splint for joint mobility and edema management.  Plan: To follow up as advised by MD.

## 2024-09-30 NOTE — PROGRESS NOTES
Reason for Appointment  No chief complaint on file.    History of Present Illness  Patient is here today 2 weeks s/p an ORIF of a right distal radius fracture and a right carpal tunnel release on 9/18/24. Patient is doing well overall.  Due to the severity of the fracture we are going to go very slow with him with no wrist motion for another 3 weeks.  We will get him into a custom splint and he needs to work on regaining full digital motion.  Wounds are healing nicely with no signs of infection, sutures removed today.  He still does have some numbness in the thumb which is very normal with the severity of his injury.  We will follow-up with him in 3 weeks with repeat x-rays of the right wrist.    Assessment   Encounter Diagnosis   Name Primary?    Colles' fracture of right radius, init for clos fx Yes

## 2024-10-08 ENCOUNTER — APPOINTMENT (OUTPATIENT)
Dept: OCCUPATIONAL THERAPY | Facility: CLINIC | Age: 34
End: 2024-10-08
Payer: MEDICAID

## 2024-10-16 ENCOUNTER — DOCUMENTATION (OUTPATIENT)
Dept: OCCUPATIONAL THERAPY | Facility: CLINIC | Age: 34
End: 2024-10-16
Payer: MEDICAID

## 2024-10-16 NOTE — PROGRESS NOTES
Occupational Therapy                 Therapy Communication Note    Patient Name: Luigi Padilla  MRN: [id number]  Department:   Room: Room/bed info not found  Today's Date: 10/16/2024     Discipline: Occupational Therapy      Missed Time: No Show    Comment:   Patient no showed to OT evaluation

## 2024-10-21 ENCOUNTER — APPOINTMENT (OUTPATIENT)
Dept: ORTHOPEDIC SURGERY | Facility: CLINIC | Age: 34
End: 2024-10-21
Payer: MEDICAID

## 2024-10-21 ENCOUNTER — OFFICE VISIT (OUTPATIENT)
Dept: ORTHOPEDIC SURGERY | Facility: CLINIC | Age: 34
End: 2024-10-21
Payer: MEDICAID

## 2024-10-21 ENCOUNTER — HOSPITAL ENCOUNTER (OUTPATIENT)
Dept: RADIOLOGY | Facility: CLINIC | Age: 34
Discharge: HOME | End: 2024-10-21
Payer: MEDICAID

## 2024-10-21 DIAGNOSIS — M25.521 RIGHT ELBOW PAIN: ICD-10-CM

## 2024-10-21 DIAGNOSIS — S52.531A COLLES' FRACTURE OF RIGHT RADIUS, INIT FOR CLOS FX: Primary | ICD-10-CM

## 2024-10-21 DIAGNOSIS — S53.401A ELBOW SPRAIN, RIGHT, INITIAL ENCOUNTER: ICD-10-CM

## 2024-10-21 DIAGNOSIS — S52.531A COLLES' FRACTURE OF RIGHT RADIUS, INIT FOR CLOS FX: ICD-10-CM

## 2024-10-21 PROCEDURE — 73110 X-RAY EXAM OF WRIST: CPT | Mod: RT

## 2024-10-21 PROCEDURE — 73080 X-RAY EXAM OF ELBOW: CPT | Mod: RT

## 2024-10-21 PROCEDURE — 73110 X-RAY EXAM OF WRIST: CPT | Mod: RIGHT SIDE | Performed by: RADIOLOGY

## 2024-10-21 PROCEDURE — 99213 OFFICE O/P EST LOW 20 MIN: CPT | Performed by: ORTHOPAEDIC SURGERY

## 2024-10-21 PROCEDURE — 73080 X-RAY EXAM OF ELBOW: CPT | Mod: RIGHT SIDE | Performed by: RADIOLOGY

## 2024-10-21 ASSESSMENT — ENCOUNTER SYMPTOMS
ARTHRALGIAS: 1
SINUS PAIN: 0
SHORTNESS OF BREATH: 0
JOINT SWELLING: 1
FATIGUE: 0
COLOR CHANGE: 0
WHEEZING: 0
CHILLS: 0
FEVER: 0

## 2024-10-21 ASSESSMENT — PAIN SCALES - GENERAL: PAINLEVEL_OUTOF10: 1

## 2024-10-21 ASSESSMENT — PAIN - FUNCTIONAL ASSESSMENT: PAIN_FUNCTIONAL_ASSESSMENT: 0-10

## 2024-10-21 NOTE — PROGRESS NOTES
Reason for Appointment  Chief Complaint   Patient presents with    Right Wrist - Follow-up     History of Present Illness  Patient is a 34 y.o. male here today for follow-up 5 weeks s/p an ORIF of a right distal radius fracture and a right carpal tunnel release with increased right lateral sided elbow pain. Patient is doing well overall. He is actually having more elbow pain. Initial x-rays of the elbow did not show any acute fracture and a located joint, x-rays taken again today of the elbow again do no show any fracture and a located joint. X-rays taken today of the wrist are reviewed and look excellent, no loosening of hardware and a healing fracture. He has been wearing a wrist brace full time other than hygiene, digits are moving well. He still has numbness in the fingers that has not really improved. No other changes in his PMH, allergies, or medications.     History reviewed. No pertinent past medical history.    History reviewed. No pertinent surgical history.    Medication Documentation Review Audit       Reviewed by Yana Calderon MA (Medical Assistant) on 10/21/24 at 1415      Medication Order Taking? Sig Documenting Provider Last Dose Status            No Medications to Display                                   No Known Allergies    Review of Systems   Constitutional:  Negative for chills, fatigue and fever.   HENT:  Negative for mouth sores, sinus pain and tinnitus.    Respiratory:  Negative for shortness of breath and wheezing.    Cardiovascular:  Negative for chest pain and leg swelling.   Musculoskeletal:  Positive for arthralgias and joint swelling.   Skin:  Negative for color change and pallor.     Exam   On exam right elbow does show some swelling and some tenderness mostly over the lateral collateral ligament.  No gross instability of the elbow.  He does not have any severe elbow stiffness. Some pain with elbow stress. Mild pain with extremes of elbow motion.  Well-healed scar, improved  swelling.  Good digital motion with no triggering.  Decree sensation light touch in the median nerve distribution.  Good pulses and capillary refill in the upper extremity    Assessment   Encounter Diagnoses   Name Primary?    Colles' fracture of right radius, init for clos fx Yes    Elbow sprain, right, initial encounter     Right elbow pain      Plan   He may have had a near dislocation injury to the right elbow and possible lateral collateral ligament sprain vs tear.  He is not having any gross instability symptoms and we did discuss possible further imaging but he would not like any further imaging at this point.  We will avoid varus and valgus stress and he can work on simple elbow motion.  We will also begin short arc wrist motion.  He can wear his wrist brace with any activity and at night.  We will follow-up with him in 4 weeks with repeat x-rays of the right wrist    Jeanie RIBERA PA-C, am acting as a scribe and attest that this documentation has been prepared under the direction and in the presence of Casey Cotto MD. By signing below, I, Casey Cotto MD, personally performed the services described in this documentation. All medical record entries made by the scribe were at my direction and in my presence. I have reviewed the chart and agree that the record reflects my personal performance and is accurate and complete.

## 2024-10-23 ENCOUNTER — EVALUATION (OUTPATIENT)
Dept: OCCUPATIONAL THERAPY | Facility: CLINIC | Age: 34
End: 2024-10-23
Payer: MEDICAID

## 2024-10-23 DIAGNOSIS — S53.401A ELBOW SPRAIN, RIGHT, INITIAL ENCOUNTER: ICD-10-CM

## 2024-10-23 DIAGNOSIS — S52.531A COLLES' FRACTURE OF RIGHT RADIUS, INIT FOR CLOS FX: ICD-10-CM

## 2024-10-23 PROCEDURE — 97110 THERAPEUTIC EXERCISES: CPT | Mod: GO | Performed by: OCCUPATIONAL THERAPIST

## 2024-10-23 PROCEDURE — 97165 OT EVAL LOW COMPLEX 30 MIN: CPT | Mod: GO | Performed by: OCCUPATIONAL THERAPIST

## 2024-10-23 NOTE — PROGRESS NOTES
Occupational Therapy Treatment  Patient Name: Luigi Padilla  MRN: [id number]  OT Received on: 10/23/2024             Insurance:  Visit Number: Visit count could not be calculated. Make sure you are using a visit which is associated with an episode. of ***  Authorization #: [id number]  Authorization Dates: ***  Insurance Type: ***      Subjective   Problem List Items Addressed This Visit             ICD-10-CM    Colles' fracture of right radius, init for clos fx S52.531A     Other Visit Diagnoses         Codes    Elbow sprain, right, initial encounter     S53.401A          Current Problem/Reason for visit:  ***     Referred by: ***    Patient reports ***    Precautions: Begin short arc wrist motion but avoid varus/valgus stress on elbow due to lateral collateral ligament sprain     Performing HEP?: {Yes/No:40257}    Pain:       Objective   (Insert Applicable ROM/Strength SmartPhrase)***    Physical Observation: ***  Edema: ***    Sensory: ***  Numbness/Tingling: ***      Treatment:    Modalities:    Therapeutic Exercise:    Therapeutic Activity:    Neuromuscular Re-education:    ADL retraining:    Post-tx pain:    Assessment/Plan       OP EDUCATION:       Goals:

## 2024-10-23 NOTE — PROGRESS NOTES
"Evaluation/Treatment    Patient Name: Luigi Duncanzdani  MRN: [id number]  : 1990  Today's Date: 10/24/24    Time Calculation  Start Time: 1245  Stop Time: 1330  Time Calculation (min): 45 min  OT Evaluation Time Entry  OT Evaluation (Low) Time Entry: 15  OT Therapeutic Procedures Time Entry  Therapeutic Exercise Time Entry: 30      Subjective   Current Problem/Diagnosis:  1. Colles' fracture of right radius, init for clos fx  Referral to Occupational Therapy    Follow Up In Occupational Therapy      2. Elbow sprain, right, initial encounter  Referral to Occupational Therapy    Follow Up In Occupational Therapy        Subjective Evaluation    History of Present Illness  Mechanism of injury: Patient is a 34-year-old male who reports he fell off his work truck and broke his wrist on 24; underwent ORIF and CTR on 24 with Dr. Cotto. Patient seen in this clinic on 24 for splint fabrication; patient followed-up with MD on 10/21/24, advised to begin weaning from splint and return for progression through post-op protocol; at time of appointment patient reported R elbow pain to MD; imaging (-) for fx; clinical dx of lateral collateral ligament sprain.     , sutures removed this date at MD; good signs of healing; ;   Splint Type: Wrist Immobilization Splint  Treatment order: Splint fabrication; regain immediate AROM R hand  Precautions: Splint AAT; NWB RUE      Pain  Current pain ratin (\"Stiff\" R wrist)  At best pain ratin  At worst pain ratin (R elbow; constant)    Social Support  Lives with: significant other (6 yo step-son)    Hand dominance: right    Diagnostic Tests  X-ray: abnormal (Comminuted angulated and displaced fracture through the distal radius with intra-articular extension and dorsal and dilation of the distal fracture fragment. Accompanying mildly displaced ulnar styloid fracture.)    Patient Goals  Patient goals for therapy: decreased edema, decreased pain, increased motion, " "increased strength, independence with ADLs/IADLs, return to sport/leisure activities and return to work  Patient goal: \"I want to be able to use my arm without pain.\"       Precautions: +NWB RUE; +short arc wrist motion; +avoid varus/valgus stress on elbow due to lateral collateral ligament sprain        Objective     Prior Functional Status: Fully Independent      Work History:     Occupation and Activities:  Work status: off work  Job title/type of work:   ; has not worked since injury    Current functional limitations: Grooming, Bathing, Dressing, Lifting, Holding, Writing, Crafts/hobbies, Tool handling, and Driving       Objective     Sensation: +Numbness R thumb/palm; improving, but abnormal sensation R IF    Appearance: +Surgical incision ~6 cm in length, healed/flat; +2 cm surgical incision at carpal tunnel, healed/flat.    Edema: +Mild edema R hand; +moderate edema in R distal FA    Coordination: +Impaired     Range of Motion/Strength:     Upper Extremity ROM  R shoulder/elbow WFL      AROM     Right Left   FOREARM Pronation 60 WFL    Supination 45 WFL   WRIST Extension 5 WFL    Flexion 10 WFL    Radial Deviation 5 WFL    Ulnar Deviation  5      WFL        Hand Range of Motion       AROM     Right Left   THUMB CMC Extension/Flexion 0/10 WFL    MP Extension/Flexion -25/15 WFL    IP Extension/Flexion 0/35 WFL   Digits 2-5 WFL R hand; unable to achieve tight grasp  Thumb opposition to tip LF    Hand Strength   (lbs) Right Left   1     2 NT NT   3     4     5       Pinch Right Left   2-pt NT NT   3-pt NT NT   Lateral NT NT       Outcome Measure: UEFI= 10/80; 88% impaired    Splinting: Patient reports MD advised patient to begin weaning from previously fabricated custom wrist splint; not present at this time.    Modalities: Educated patient on hot/cold modalities for pain management and pre/post-HEP.    Therapy/Activity: Therapist provided demonstration with verbal instruction for " "scar massage tech for scar tissue and adhesion management. Therapist educated patient on edema management strategies including use of compression sleeve for lymphatic drainage. Therapist provided demonstration with verbal instruction for thumb AROM including radial abduction/adduction, opposition, composite flexion/extension, palmar abduction/adduction, IP flexion/extension, and CW/CCW circumduction; 10 reps each, written handout provided; HEP established. Therapist provided demonstration with verbal instruction for tendon glides right hand x10 reps; written handout issued. Therapist provided demonstration with verbal instruction for R wrsit short arc flexion/extension and FA sup/pro x10 reps; written handout issued. Therapist educated patient on neutral alignment of elbow and importance of avoiding varus/valgus movement; patient verbalized understanding.    EDUCATION: See above.       Assessment & Plan     Assessment  Impairments: abnormal coordination, abnormal or restricted ROM, activity intolerance, impaired physical strength, lacks appropriate home exercise program, pain with function and weight-bearing intolerance  Assessment details: Patient is a 34-year-old male who sustained a R wrist fx s/p ORIF and CTR with subsequent pain, edema, impaired ROM, impaired coordination, abnormal sensation, weakness, and impaired functional use of RUE. Skilled OT intervention indicated to address deficits and facilitate return to PLOF.    Low complexity evaluation selected due to patient's clinical presentation, medical stability, and condition uncomplicated by existing co-morbidities that may affect patient's rehab tolerance, progression, and potential.   Prognosis: good    Goals  \"I want to be able to use my arm without pain.\"    Plan  Planned modality interventions: fluidotherapy, thermotherapy (hydrocollator packs), ultrasound, TENS and low level laser therapy  Planned therapy interventions: ADL retraining, compression, " fine motor coordination training, flexibility, home exercise program, IADL retraining, manual therapy, motor coordination training, neuromuscular re-education, orthotic fitting/training, soft tissue mobilization, strengthening, stretching and therapeutic activities  Frequency: 1x week  Duration in visits: 14  Treatment plan discussed with: patient  Plan details: [employer]; Auth after 30 visits           Goals:  Active       OT Goals       1. Patient will increase R thumb AROM MP and IP flexion by at least 15 degrees and MP extension to 0 for improved functional use of R hand.       Start:  10/24/24    Expected End:  12/04/24            2. Patient will increase R wrist flexion/extension by at least 40 degrees for improve functional use of R hand.       Start:  10/24/24    Expected End:  12/04/24            3. Patient will increase R FA sup/pro to at least 75 degrees for improved functional use of RUE.       Start:  10/24/24    Expected End:  12/04/24            4. Patient increase R hand  strength to within 90% of L for improved functional use of R hand.       Start:  10/24/24    Expected End:  12/04/24            5. Patient will increase R hand pinch strength in all patterns to within 90% of R for improved functional use of R hand.       Start:  10/24/24    Expected End:  12/04/24            6. Patient will increase overall functional ease and independence AEB UEFI score of at least 60/80 for improved quality of life for patient.       Start:  10/24/24    Expected End:  01/01/25

## 2024-10-24 PROBLEM — S53.401A ELBOW SPRAIN, RIGHT, INITIAL ENCOUNTER: Status: ACTIVE | Noted: 2024-10-24

## 2024-10-24 ASSESSMENT — ENCOUNTER SYMPTOMS
PAIN SCALE: 1
PAIN SCALE AT LOWEST: 1
PAIN SCALE AT HIGHEST: 8

## 2024-10-30 ENCOUNTER — TREATMENT (OUTPATIENT)
Dept: OCCUPATIONAL THERAPY | Facility: CLINIC | Age: 34
End: 2024-10-30
Payer: MEDICAID

## 2024-10-30 DIAGNOSIS — S52.531A COLLES' FRACTURE OF RIGHT RADIUS, INIT FOR CLOS FX: ICD-10-CM

## 2024-10-30 DIAGNOSIS — S53.401A ELBOW SPRAIN, RIGHT, INITIAL ENCOUNTER: ICD-10-CM

## 2024-10-30 PROCEDURE — 97110 THERAPEUTIC EXERCISES: CPT | Mod: GO,CO

## 2024-10-30 ASSESSMENT — PAIN - FUNCTIONAL ASSESSMENT: PAIN_FUNCTIONAL_ASSESSMENT: 0-10

## 2024-10-30 ASSESSMENT — PAIN DESCRIPTION - DESCRIPTORS: DESCRIPTORS: ACHING;SORE

## 2024-10-30 ASSESSMENT — PAIN SCALES - GENERAL: PAINLEVEL_OUTOF10: 3

## 2024-11-04 ENCOUNTER — HOSPITAL ENCOUNTER (OUTPATIENT)
Dept: RADIOLOGY | Facility: CLINIC | Age: 34
Discharge: HOME | End: 2024-11-04
Payer: MEDICAID

## 2024-11-04 ENCOUNTER — APPOINTMENT (OUTPATIENT)
Dept: ORTHOPEDIC SURGERY | Facility: CLINIC | Age: 34
End: 2024-11-04
Payer: MEDICAID

## 2024-11-04 DIAGNOSIS — S52.531A COLLES' FRACTURE OF RIGHT RADIUS, INIT FOR CLOS FX: Primary | ICD-10-CM

## 2024-11-04 DIAGNOSIS — S53.401A ELBOW SPRAIN, RIGHT, INITIAL ENCOUNTER: ICD-10-CM

## 2024-11-04 DIAGNOSIS — S52.531A COLLES' FRACTURE OF RIGHT RADIUS, INIT FOR CLOS FX: ICD-10-CM

## 2024-11-04 PROCEDURE — 73110 X-RAY EXAM OF WRIST: CPT | Mod: RIGHT SIDE | Performed by: STUDENT IN AN ORGANIZED HEALTH CARE EDUCATION/TRAINING PROGRAM

## 2024-11-04 PROCEDURE — 99213 OFFICE O/P EST LOW 20 MIN: CPT | Performed by: ORTHOPAEDIC SURGERY

## 2024-11-04 PROCEDURE — 73110 X-RAY EXAM OF WRIST: CPT | Mod: RT

## 2024-11-04 RX ORDER — CEPHALEXIN 500 MG/1
500 CAPSULE ORAL 4 TIMES DAILY
Qty: 40 CAPSULE | Refills: 0 | Status: SHIPPED | OUTPATIENT
Start: 2024-11-04 | End: 2024-11-14

## 2024-11-04 ASSESSMENT — ENCOUNTER SYMPTOMS
FEVER: 0
WHEEZING: 0
SORE THROAT: 0
ARTHRALGIAS: 1
WOUND: 0
CHILLS: 0
FATIGUE: 0
RHINORRHEA: 0
SHORTNESS OF BREATH: 0
JOINT SWELLING: 1

## 2024-11-04 ASSESSMENT — PAIN SCALES - GENERAL: PAINLEVEL_OUTOF10: 1

## 2024-11-04 ASSESSMENT — PAIN - FUNCTIONAL ASSESSMENT: PAIN_FUNCTIONAL_ASSESSMENT: 0-10

## 2024-11-04 NOTE — PROGRESS NOTES
Reason for Appointment  Chief Complaint   Patient presents with    Right Wrist - Follow-up, Fracture     History of Present Illness  Patient is a 34 y.o. male here today for follow-up evaluation 7 weeks s/p an ORIF of a right distal radius fracture and a right carpal tunnel release. He is making slow improvement in terms of wrist motion.  He is working in therapy, numbness and tingling in the hand is improving and elbow pain is improving.  He has been wearing a simple compression sleeve on the elbow and pain and swelling has improved.  X-rays taken today are reviewed and look excellent, no loosening of hardware and bone is filling in nicely.  We have gone slow with him in terms of wrist motion due to the severity of his fracture.  He may be having some irritation from the absorbable suture, we discussed simple soap and water cleanse and alcohol scrubs to the area would like to avoid irritating the skin anymore. No other changes in his PMH, allergies, or medications    History reviewed. No pertinent past medical history.    History reviewed. No pertinent surgical history.    Medication Documentation Review Audit       Reviewed by Yana Calderon MA (Medical Assistant) on 11/04/24 at 0834      Medication Order Taking? Sig Documenting Provider Last Dose Status            No Medications to Display                                   No Known Allergies    Review of Systems   Constitutional:  Negative for chills, fatigue and fever.   HENT:  Negative for mouth sores, rhinorrhea and sore throat.    Respiratory:  Negative for shortness of breath and wheezing.    Cardiovascular:  Negative for chest pain and leg swelling.   Musculoskeletal:  Positive for arthralgias and joint swelling.   Skin:  Negative for rash and wound.     Exam   On exam the right elbow shows better swelling today, he has good flexion and extension with no gross instability.  Right wrist shows some mild redness over the Monocryl sutures but no purulent  drainage or signs of infection today.  Wrist motion is slowly improving.  Good digital motion.  Good pulses and capillary refill in the upper extremity    Assessment   Encounter Diagnoses   Name Primary?    Colles' fracture of right radius, init for clos fx Yes    Elbow sprain, right, initial encounter      Plan   We will watch the wound for now, we discussed simple soap and water cleanses and alcohol cleanses, any increase in redness, swelling, or drainage he should call us immediately and we did send in a course of oral antibiotics to have in case of increased symptoms.  He will continue to work on wrist motion, elbow is improving.  He understands still no heavy lifting, we will follow-up with him in 4 weeks with repeat x-rays of the right wrist.    I, Jeanie Hinds PA-C, am acting as a scribe and attest that this documentation has been prepared under the direction and in the presence of Casey Cotto MD.   By signing below, I, Casey Cotto MD, personally performed the services described in this documentation. All medical record entries made by the scribe were at my direction and in my presence. I have reviewed the chart and agree that the record reflects my personal performance and is accurate and complete.

## 2024-11-06 ENCOUNTER — APPOINTMENT (OUTPATIENT)
Dept: OCCUPATIONAL THERAPY | Facility: CLINIC | Age: 34
End: 2024-11-06
Payer: MEDICAID

## 2024-11-06 NOTE — PROGRESS NOTES
Occupational Therapy                 Therapy Communication Note    Patient Name: Luigi Padilla  MRN: [id number]  Department:   Room: Room/bed info not found  Today's Date: 11/6/2024     Discipline: Occupational Therapy    Missed Time: Cancel    Comment: Patient cancelled visit. Writer called patient, he plans on attending next scheduled therapy session.

## 2024-11-13 ENCOUNTER — DOCUMENTATION (OUTPATIENT)
Dept: OCCUPATIONAL THERAPY | Facility: CLINIC | Age: 34
End: 2024-11-13
Payer: MEDICAID

## 2024-11-13 NOTE — PROGRESS NOTES
Occupational Therapy                 Therapy Communication Note    Patient Name: Luigi Padilla  MRN: [id number]  Department:   Room: Room/bed info not found  Today's Date: 11/13/2024     Discipline: Occupational Therapy    Missed Visit Reason:  Patient No show, no call.     Missed Time: No Show    Comment:Writer called patient and his girlfriend listed as an emergency contact. Both mailboxes were full, unable to leave message.

## 2024-11-18 ENCOUNTER — APPOINTMENT (OUTPATIENT)
Dept: ORTHOPEDIC SURGERY | Facility: CLINIC | Age: 34
End: 2024-11-18
Payer: MEDICAID

## 2024-11-18 DIAGNOSIS — S52.531A COLLES' FRACTURE OF RIGHT RADIUS, INIT FOR CLOS FX: Primary | ICD-10-CM

## 2024-11-18 PROCEDURE — 99024 POSTOP FOLLOW-UP VISIT: CPT | Performed by: ORTHOPAEDIC SURGERY

## 2024-11-18 ASSESSMENT — PAIN SCALES - GENERAL: PAINLEVEL_OUTOF10: 0 - NO PAIN

## 2024-11-18 ASSESSMENT — PAIN - FUNCTIONAL ASSESSMENT: PAIN_FUNCTIONAL_ASSESSMENT: 0-10

## 2024-11-18 NOTE — PROGRESS NOTES
Reason for Appointment  Chief Complaint   Patient presents with    Right Wrist - Follow-up, Fracture     History of Present Illness  Patient is here today 9 weeks s/p an ORIF of a right distal radius fracture and a right carpal tunnel release. Patient is doing well overall.  He was having some irritation from the absorbable suture, this does look much better today.  He understands simple cleansing and any increase in redness, swelling, or drainage he should call us immediately.  Continue to work on regaining motion but with the severity of his fracture we are going slow.  He is unable to return to work at this point.  Will follow-up with him in 4 weeks with repeat x-rays of the right wrist.    Assessment   Right distal radius fracture

## 2024-11-20 ENCOUNTER — APPOINTMENT (OUTPATIENT)
Dept: OCCUPATIONAL THERAPY | Facility: CLINIC | Age: 34
End: 2024-11-20
Payer: MEDICAID

## 2024-11-27 ENCOUNTER — TREATMENT (OUTPATIENT)
Dept: OCCUPATIONAL THERAPY | Facility: CLINIC | Age: 34
End: 2024-11-27
Payer: MEDICAID
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
## 2024-12-02 ENCOUNTER — APPOINTMENT (OUTPATIENT)
Dept: ORTHOPEDIC SURGERY | Facility: CLINIC | Age: 34
End: 2024-12-02
Payer: MEDICAID

## 2024-12-04 ENCOUNTER — DOCUMENTATION (OUTPATIENT)
Dept: OCCUPATIONAL THERAPY | Facility: CLINIC | Age: 34
End: 2024-12-04
Payer: MEDICAID

## 2024-12-04 NOTE — PROGRESS NOTES
Occupational Therapy                 Therapy Communication Note    Patient Name: Luigi Padilla  MRN: [id number]  Department:   Room: Room/bed info not found  Today's Date: 12/4/2024     Discipline: Occupational Therapy    Missed Time: No Show    Comment: Patient failed to call/show for scheduled appointment.

## 2024-12-16 ENCOUNTER — OFFICE VISIT (OUTPATIENT)
Dept: ORTHOPEDIC SURGERY | Facility: CLINIC | Age: 34
End: 2024-12-16
Payer: MEDICAID

## 2024-12-16 ENCOUNTER — APPOINTMENT (OUTPATIENT)
Dept: ORTHOPEDIC SURGERY | Facility: CLINIC | Age: 34
End: 2024-12-16
Payer: MEDICAID

## 2024-12-16 ENCOUNTER — HOSPITAL ENCOUNTER (OUTPATIENT)
Dept: RADIOLOGY | Facility: CLINIC | Age: 34
Discharge: HOME | End: 2024-12-16
Payer: MEDICAID

## 2024-12-16 DIAGNOSIS — S52.531A COLLES' FRACTURE OF RIGHT RADIUS, INIT FOR CLOS FX: ICD-10-CM

## 2024-12-16 DIAGNOSIS — S52.531A COLLES' FRACTURE OF RIGHT RADIUS, INIT FOR CLOS FX: Primary | ICD-10-CM

## 2024-12-16 PROCEDURE — 99024 POSTOP FOLLOW-UP VISIT: CPT | Performed by: ORTHOPAEDIC SURGERY

## 2024-12-16 PROCEDURE — 73110 X-RAY EXAM OF WRIST: CPT | Mod: RIGHT SIDE | Performed by: RADIOLOGY

## 2024-12-16 PROCEDURE — 73110 X-RAY EXAM OF WRIST: CPT | Mod: RT

## 2024-12-16 ASSESSMENT — PAIN SCALES - GENERAL: PAINLEVEL_OUTOF10: 1

## 2024-12-16 ASSESSMENT — PAIN - FUNCTIONAL ASSESSMENT: PAIN_FUNCTIONAL_ASSESSMENT: 0-10

## 2024-12-17 NOTE — PROGRESS NOTES
Reason for Appointment  Chief Complaint   Patient presents with    Right Wrist - Follow-up, Fracture     History of Present Illness  Patient is here today 3 months s/p an ORIF of a right distal radius fracture and a right carpal tunnel release. Patient is doing well overall.  He is improving, still working in therapy on regaining motion and he did have a severe fracture and we have gone slow with him.  X-rays taken today are reviewed and look excellent.  Due to the heavy nature of his job as a  he is unable to return back to work at this point.  Numbness continues to improve and he just has some mild numbness in the thumb at this point.  We will follow-up with him 2 months and reassess him to return back to work at that point.    Assessment   Right distal radius fracture

## 2025-02-17 ENCOUNTER — APPOINTMENT (OUTPATIENT)
Dept: ORTHOPEDIC SURGERY | Facility: CLINIC | Age: 35
End: 2025-02-17
Payer: MEDICAID

## 2025-03-10 ENCOUNTER — APPOINTMENT (OUTPATIENT)
Dept: ORTHOPEDIC SURGERY | Facility: CLINIC | Age: 35
End: 2025-03-10
Payer: MEDICAID

## 2025-03-24 ENCOUNTER — APPOINTMENT (OUTPATIENT)
Dept: ORTHOPEDIC SURGERY | Facility: CLINIC | Age: 35
End: 2025-03-24
Payer: MEDICAID

## 2025-03-24 ENCOUNTER — HOSPITAL ENCOUNTER (OUTPATIENT)
Dept: RADIOLOGY | Facility: CLINIC | Age: 35
Discharge: HOME | End: 2025-03-24
Payer: MEDICAID

## 2025-03-24 DIAGNOSIS — S53.439A COMPLETE TEAR OF LATERAL COLLATERAL LIGAMENT OF ELBOW, INITIAL ENCOUNTER: Primary | ICD-10-CM

## 2025-03-24 DIAGNOSIS — S52.531A COLLES' FRACTURE OF RIGHT RADIUS, INIT FOR CLOS FX: ICD-10-CM

## 2025-03-24 PROCEDURE — 99213 OFFICE O/P EST LOW 20 MIN: CPT | Performed by: PHYSICIAN ASSISTANT

## 2025-03-24 PROCEDURE — 73110 X-RAY EXAM OF WRIST: CPT | Mod: RIGHT SIDE | Performed by: RADIOLOGY

## 2025-03-24 PROCEDURE — 73110 X-RAY EXAM OF WRIST: CPT | Mod: RT

## 2025-03-24 ASSESSMENT — ENCOUNTER SYMPTOMS
RHINORRHEA: 0
JOINT SWELLING: 1
FATIGUE: 0
CHILLS: 0
SORE THROAT: 0
FEVER: 0
ARTHRALGIAS: 1
SHORTNESS OF BREATH: 0
WHEEZING: 0

## 2025-03-24 ASSESSMENT — PAIN SCALES - GENERAL: PAINLEVEL_OUTOF10: 3

## 2025-03-24 ASSESSMENT — PAIN - FUNCTIONAL ASSESSMENT: PAIN_FUNCTIONAL_ASSESSMENT: 0-10
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
## 2025-04-07 ENCOUNTER — HOSPITAL ENCOUNTER (OUTPATIENT)
Dept: RADIOLOGY | Facility: HOSPITAL | Age: 35
Discharge: HOME | End: 2025-04-07
Payer: MEDICAID

## 2025-04-07 DIAGNOSIS — S53.439A COMPLETE TEAR OF LATERAL COLLATERAL LIGAMENT OF ELBOW, INITIAL ENCOUNTER: ICD-10-CM

## 2025-04-07 PROCEDURE — 73221 MRI JOINT UPR EXTREM W/O DYE: CPT | Mod: RIGHT SIDE | Performed by: RADIOLOGY

## 2025-04-07 PROCEDURE — 73221 MRI JOINT UPR EXTREM W/O DYE: CPT | Mod: RT

## 2025-04-17 ENCOUNTER — OFFICE VISIT (OUTPATIENT)
Dept: ORTHOPEDIC SURGERY | Facility: HOSPITAL | Age: 35
End: 2025-04-17
Payer: MEDICAID

## 2025-04-17 DIAGNOSIS — S53.401A ELBOW SPRAIN, RIGHT, INITIAL ENCOUNTER: Primary | ICD-10-CM

## 2025-04-17 ASSESSMENT — PAIN - FUNCTIONAL ASSESSMENT: PAIN_FUNCTIONAL_ASSESSMENT: 0-10

## 2025-04-17 ASSESSMENT — ENCOUNTER SYMPTOMS
WHEEZING: 0
FATIGUE: 0
JOINT SWELLING: 0
WOUND: 0
SHORTNESS OF BREATH: 0
RHINORRHEA: 0
ARTHRALGIAS: 1
CHILLS: 0
SORE THROAT: 0
FEVER: 0

## 2025-04-17 ASSESSMENT — PAIN SCALES - GENERAL: PAINLEVEL_OUTOF10: 3

## 2025-04-17 NOTE — PROGRESS NOTES
Reason for Appointment  No chief complaint on file.    History of Present Illness  Patient is a 34 y.o. male here today for follow-up evaluation of elbow.  He has had elbow pain since he had a significant fall about 8 months ago off of the bed of a truck and he had a severe distal radius fracture.  MRI of the elbow is reviewed with the report and does show a sprain of the UCL with bone bruise of the radial head sequela of subluxation of the radiocapitellar joint.  He is not having any instability in the elbow but more pain with any repetitive activity and lifting.  He is not wearing anything on the elbow.  No other changes in his past medical history, allergies, or medications.    Medical History[1]    Surgical History[2]    Medication Documentation Review Audit       Reviewed by Jeanie Hinds PA-C (Physician Assistant) on 03/25/25 at 0758      Medication Order Taking? Sig Documenting Provider Last Dose Status            No Medications to Display                                   RX Allergies[3]    Review of Systems   Constitutional:  Negative for chills, fatigue and fever.   HENT:  Negative for mouth sores, rhinorrhea and sore throat.    Respiratory:  Negative for shortness of breath and wheezing.    Cardiovascular:  Negative for chest pain and leg swelling.   Musculoskeletal:  Positive for arthralgias. Negative for joint swelling.   Skin:  Negative for pallor and wound.     Exam   On exam both elbows shows symmetric hyperlaxity.  He does have more tenderness today over the medial epicondyle, still has some tenderness over the lateral epicondyle as well.  He has full elbow motion.  No gross instability he is able to do a table pushoff test. Good pulses and sensation in the upper extremity..     Assessment   Right elbow sprain    Plan   We discussed treatment options with him today, he did have a significant sprain of mostly the lateral aspect of the elbow but he does have significant hyperlaxity in both  elbows.  We would need to be very careful with any surgical intervention as this could give him significant issues with stiffness long-term and he is not having any gross instability as well.  He has good motion today but his biggest issue is pain.  We discussed, using a hinged elbow brace and co he is not interested in any further intervention at this point, he can follow-up with us as needed.  Mpression sleeve with certain activities to give the elbow support.    I, Jeanie Hinds PA-C, am acting as a scribe and attest that this documentation has been prepared under the direction and in the presence of Casey Cotto MD.  By signing below, I, Casey Cotto MD, personally performed the services described in this documentation. All medical record entries made by the scribe were at my direction and in my presence. I have reviewed the chart and agree that the record reflects my personal performance and is accurate and complete.                         [1] No past medical history on file.  [2] No past surgical history on file.  [3] No Known Allergies

## 2025-05-21 ENCOUNTER — DOCUMENTATION (OUTPATIENT)
Dept: OCCUPATIONAL THERAPY | Facility: CLINIC | Age: 35
End: 2025-05-21
Payer: MEDICAID

## 2025-05-21 NOTE — PROGRESS NOTES
Occupational Therapy                 Therapy Communication Note    Patient Name: Luigi Padilla  MRN: [id number]  Today's Date: 5/21/2025     Discipline: Occupational Therapy      Comment: Patient see for OT evaluation and one subsequent tx sx; cancelled/no show for all remaining appointments; patient failed to return to clinic.

## (undated) DEVICE — SPONGE GAUZE, XRAY SC+RFID, 4X4 16 PLY, STERILE

## (undated) DEVICE — DRESSING, NON-ADHERENT, 3 X 3 IN, STERILE

## (undated) DEVICE — TUBING, SUCTION, 6MM X 10, CLEAN N-COND

## (undated) DEVICE — SUTURE, PROLENE, 3-0, 18 IN, PS2, BLUE

## (undated) DEVICE — SOLUTION, CHLORHEXIDINE, 4%, 4OZ

## (undated) DEVICE — DRILL BIT 2.2MM

## (undated) DEVICE — PADDING, UNDERCAST, WEBRIL, 3 IN X 4 YD, REG, NS

## (undated) DEVICE — TRAY, DRY PREP, PREMIUM

## (undated) DEVICE — Device

## (undated) DEVICE — WOUND SYSTEM, DEBRIDEMENT & CLEANING, O.R DUOPAK

## (undated) DEVICE — CUFF, TOURNIQUET, 18 X 4, DUAL PORT/SNGL BLADDER, DISP, LF

## (undated) DEVICE — NEEDLE, ELECTRODE, ELECTROSURGICAL, INSULATED

## (undated) DEVICE — SUTURE, MONOCRYL, 3-0, 27 IN, PS-2, UNDYED

## (undated) DEVICE — BLANKET, LOWER BODY, VHA PLUS, ADULT

## (undated) DEVICE — PENCIL, ELECTROSURG, W/BUTTON SWITCH & HOLSTER, EZ CLEAN, DISP

## (undated) DEVICE — GOWN, SURGICAL, SIRUS, NON REINFORCED, LARGE

## (undated) DEVICE — SUTURE, PROLENE 4-0, 18IN, PS2, BLUE MONO

## (undated) DEVICE — GLOVE, SURGICAL, PROTEXIS PI , 7.5, PF, LF